# Patient Record
Sex: FEMALE | Race: WHITE | Employment: OTHER | URBAN - METROPOLITAN AREA
[De-identification: names, ages, dates, MRNs, and addresses within clinical notes are randomized per-mention and may not be internally consistent; named-entity substitution may affect disease eponyms.]

---

## 2023-08-23 PROBLEM — E11.65 HYPERGLYCEMIA DUE TO TYPE 2 DIABETES MELLITUS (MULTI): Status: ACTIVE | Noted: 2023-08-23

## 2023-08-23 PROBLEM — Z96.651 HISTORY OF RIGHT KNEE JOINT REPLACEMENT: Status: ACTIVE | Noted: 2023-08-23

## 2023-08-23 PROBLEM — E53.8 B12 DEFICIENCY: Status: ACTIVE | Noted: 2023-08-23

## 2023-08-23 PROBLEM — N95.2 ATROPHY OF VAGINA: Status: ACTIVE | Noted: 2023-08-23

## 2023-08-23 PROBLEM — M17.12 PRIMARY OSTEOARTHRITIS OF LEFT KNEE: Status: ACTIVE | Noted: 2023-08-23

## 2023-08-23 PROBLEM — I10 ESSENTIAL (PRIMARY) HYPERTENSION: Status: ACTIVE | Noted: 2023-08-23

## 2023-08-23 PROBLEM — E11.42 TYPE 2 DIABETES MELLITUS WITH DIABETIC POLYNEUROPATHY, WITHOUT LONG-TERM CURRENT USE OF INSULIN (MULTI): Status: ACTIVE | Noted: 2023-08-23

## 2023-08-23 PROBLEM — E83.52 HYPERCALCEMIA: Status: ACTIVE | Noted: 2023-08-23

## 2023-08-23 PROBLEM — E55.9 VITAMIN D DEFICIENCY: Status: ACTIVE | Noted: 2023-08-23

## 2023-08-23 PROBLEM — E66.8 OTHER OBESITY: Status: ACTIVE | Noted: 2023-08-23

## 2023-08-23 PROBLEM — E66.89 OTHER OBESITY: Status: ACTIVE | Noted: 2023-08-23

## 2023-08-23 PROBLEM — E78.00 ELEVATED CHOLESTEROL: Status: ACTIVE | Noted: 2023-08-23

## 2023-08-23 RX ORDER — ESTRADIOL 0.1 MG/G
CREAM VAGINAL
COMMUNITY
Start: 2022-05-17

## 2023-08-23 RX ORDER — PRAVASTATIN SODIUM 20 MG/1
20 TABLET ORAL DAILY
COMMUNITY
Start: 2015-11-30 | End: 2024-04-01

## 2023-08-23 RX ORDER — PANTOPRAZOLE SODIUM 40 MG/1
1 TABLET, DELAYED RELEASE ORAL DAILY
COMMUNITY
Start: 2018-12-31

## 2023-08-23 RX ORDER — LANOLIN ALCOHOL/MO/W.PET/CERES
1000 CREAM (GRAM) TOPICAL DAILY
COMMUNITY

## 2023-08-23 RX ORDER — LOTEPREDNOL ETABONATE AND TOBRAMYCIN 5; 3 MG/ML; MG/ML
1 SUSPENSION/ DROPS OPHTHALMIC 4 TIMES DAILY
COMMUNITY
Start: 2018-05-07 | End: 2023-10-16 | Stop reason: ALTCHOICE

## 2023-08-23 RX ORDER — MECLIZINE HCL 12.5 MG 12.5 MG/1
12.5 TABLET ORAL AS NEEDED
COMMUNITY

## 2023-08-23 RX ORDER — VIT A/VIT C/VIT E/ZINC/COPPER 4296-226
CAPSULE ORAL
COMMUNITY

## 2023-08-23 RX ORDER — METFORMIN HYDROCHLORIDE 500 MG/1
1000 TABLET, EXTENDED RELEASE ORAL EVERY EVENING
COMMUNITY
Start: 2013-12-16 | End: 2023-11-03 | Stop reason: SDUPTHER

## 2023-08-23 RX ORDER — CETIRIZINE HYDROCHLORIDE, PSEUDOEPHEDRINE HYDROCHLORIDE 5; 120 MG/1; MG/1
TABLET, FILM COATED, EXTENDED RELEASE ORAL
COMMUNITY

## 2023-08-23 RX ORDER — DULAGLUTIDE 1.5 MG/.5ML
1.5 INJECTION, SOLUTION SUBCUTANEOUS
COMMUNITY
Start: 2018-02-06 | End: 2023-11-10

## 2023-08-23 RX ORDER — ALPRAZOLAM 0.5 MG/1
0.5 TABLET ORAL AS NEEDED
COMMUNITY
Start: 2014-01-13

## 2023-08-23 RX ORDER — CHOLECALCIFEROL (VITAMIN D3) 50 MCG
TABLET ORAL
COMMUNITY

## 2023-08-23 RX ORDER — LANSOPRAZOLE 30 MG/1
1 CAPSULE, DELAYED RELEASE ORAL DAILY
COMMUNITY
Start: 2014-02-13 | End: 2023-10-16 | Stop reason: ALTCHOICE

## 2023-08-23 RX ORDER — TRIAMCINOLONE ACETONIDE 55 UG/1
SPRAY, METERED NASAL
COMMUNITY
End: 2023-10-16 | Stop reason: ALTCHOICE

## 2023-08-23 RX ORDER — ASPIRIN 81 MG/1
81 TABLET ORAL DAILY
COMMUNITY

## 2023-08-23 RX ORDER — BLOOD SUGAR DIAGNOSTIC
STRIP MISCELLANEOUS DAILY
COMMUNITY
End: 2024-01-25 | Stop reason: SDUPTHER

## 2023-08-23 RX ORDER — IBUPROFEN 100 MG/5ML
1000 SUSPENSION, ORAL (FINAL DOSE FORM) ORAL DAILY
COMMUNITY

## 2023-08-23 RX ORDER — CETIRIZINE HYDROCHLORIDE 10 MG/1
10 TABLET ORAL DAILY
COMMUNITY
End: 2023-10-16 | Stop reason: SDUPTHER

## 2023-08-23 RX ORDER — LIFITEGRAST 50 MG/ML
1 SOLUTION/ DROPS OPHTHALMIC 2 TIMES DAILY
COMMUNITY
Start: 2018-05-11 | End: 2023-10-16 | Stop reason: ALTCHOICE

## 2023-08-23 RX ORDER — OXYCODONE AND ACETAMINOPHEN 5; 325 MG/1; MG/1
1-2 TABLET ORAL
COMMUNITY
Start: 2016-01-07 | End: 2023-10-16 | Stop reason: ALTCHOICE

## 2023-08-23 RX ORDER — FLAXSEED OIL 1000 MG
CAPSULE ORAL
COMMUNITY
End: 2023-10-16 | Stop reason: ALTCHOICE

## 2023-08-23 RX ORDER — CYCLOSPORINE 0.5 MG/ML
1 EMULSION OPHTHALMIC 2 TIMES DAILY
COMMUNITY

## 2023-08-23 RX ORDER — GABAPENTIN 300 MG/1
CAPSULE ORAL
COMMUNITY
Start: 2015-09-24 | End: 2023-10-16

## 2023-08-23 RX ORDER — LISINOPRIL AND HYDROCHLOROTHIAZIDE 10; 12.5 MG/1; MG/1
1 TABLET ORAL DAILY
COMMUNITY
Start: 2014-02-13

## 2023-10-16 ENCOUNTER — OFFICE VISIT (OUTPATIENT)
Dept: ENDOCRINOLOGY | Facility: CLINIC | Age: 63
End: 2023-10-16
Payer: COMMERCIAL

## 2023-10-16 VITALS
WEIGHT: 191 LBS | DIASTOLIC BLOOD PRESSURE: 85 MMHG | SYSTOLIC BLOOD PRESSURE: 117 MMHG | HEART RATE: 87 BPM | BODY MASS INDEX: 31.78 KG/M2

## 2023-10-16 DIAGNOSIS — E10.42 DIABETIC POLYNEUROPATHY ASSOCIATED WITH TYPE 1 DIABETES MELLITUS (MULTI): Chronic | ICD-10-CM

## 2023-10-16 DIAGNOSIS — E11.65 TYPE 2 DIABETES MELLITUS WITH HYPERGLYCEMIA, WITHOUT LONG-TERM CURRENT USE OF INSULIN (MULTI): Primary | ICD-10-CM

## 2023-10-16 DIAGNOSIS — E78.00 ELEVATED CHOLESTEROL: Chronic | ICD-10-CM

## 2023-10-16 DIAGNOSIS — I10 ESSENTIAL (PRIMARY) HYPERTENSION: Chronic | ICD-10-CM

## 2023-10-16 LAB — POC HEMOGLOBIN A1C: 6 % (ref 4.2–6.5)

## 2023-10-16 PROCEDURE — 99213 OFFICE O/P EST LOW 20 MIN: CPT | Performed by: NURSE PRACTITIONER

## 2023-10-16 PROCEDURE — 3074F SYST BP LT 130 MM HG: CPT | Performed by: NURSE PRACTITIONER

## 2023-10-16 PROCEDURE — 3079F DIAST BP 80-89 MM HG: CPT | Performed by: NURSE PRACTITIONER

## 2023-10-16 PROCEDURE — 83036 HEMOGLOBIN GLYCOSYLATED A1C: CPT | Performed by: NURSE PRACTITIONER

## 2023-10-16 ASSESSMENT — PATIENT HEALTH QUESTIONNAIRE - PHQ9
1. LITTLE INTEREST OR PLEASURE IN DOING THINGS: NOT AT ALL
2. FEELING DOWN, DEPRESSED OR HOPELESS: NOT AT ALL
SUM OF ALL RESPONSES TO PHQ9 QUESTIONS 1 & 2: 0

## 2023-10-16 ASSESSMENT — LIFESTYLE VARIABLES
HOW MANY STANDARD DRINKS CONTAINING ALCOHOL DO YOU HAVE ON A TYPICAL DAY: 1 OR 2
HOW OFTEN DO YOU HAVE A DRINK CONTAINING ALCOHOL: 2-4 TIMES A MONTH

## 2023-10-16 ASSESSMENT — PAIN SCALES - GENERAL: PAINLEVEL: 0-NO PAIN

## 2023-10-16 NOTE — PROGRESS NOTES
61yo with Diabetes 2 (dx age 51) , HTN, Dyslipidemia presents for followup. A1c 6.0% was 5.9%. Pt is testing sugars 1 times per day. Pt is having low sugars 0 times/week. Pt's typical blood sugars are running  on waking, not testing at other times of day . Pt is following a carb controlled diet and knows reasonable carb allowances. Pt is able to afford their medications. Pt is exercising riding stationary bike/walking/swimming.           -CGM no desire  -INSULIN has not been on in the past  -Metformin 500mg er (2), GLP1 Trulicity 1.5mg  -STATIN Pravastatin 20mg for lipids and tolerating LDL   -HTN Lisinopril 10mg/hctz 12.5mg    /85 (BP Location: Left arm, Patient Position: Sitting)   Pulse 87   Wt 86.6 kg (191 lb)   BMI 31.78 kg/m²       Current Outpatient Medications:     ALPRAZolam (Xanax) 0.5 mg tablet, Take 1 tablet (0.5 mg) by mouth if needed., Disp: , Rfl:     ascorbic acid (Vitamin C) 1,000 mg tablet, Take 1 tablet (1,000 mg) by mouth once daily., Disp: , Rfl:     aspirin (Nilam Low Dose Aspirin) 81 mg EC tablet, Take 1 tablet (81 mg) by mouth once daily., Disp: , Rfl:     blood sugar diagnostic (Accu-Chek Amelia Plus test strp) strip, once daily., Disp: , Rfl:     cetirizine-pseudoephedrine (ZyrTEC-D) 5-120 mg 12 hr tablet, Take by mouth., Disp: , Rfl:     cholecalciferol (Vitamin D-3) 50 MCG (2000 UT) tablet, Take by mouth., Disp: , Rfl:     cyanocobalamin (Vitamin B-12) 1,000 mcg tablet, Take 1 tablet (1,000 mcg) by mouth once daily., Disp: , Rfl:     cycloSPORINE (Restasis) 0.05 % ophthalmic emulsion, Administer 1 drop into both eyes 2 times a day., Disp: , Rfl:     dulaglutide (Trulicity) 1.5 mg/0.5 mL pen injector injection, Inject 1.5 mg under the skin 1 (one) time per week., Disp: , Rfl:     estradiol (Estrace) 0.01 % (0.1 mg/gram) vaginal cream, Insert into the vagina., Disp: , Rfl:     lifitegrast (Xiidra) 5 % dropperette, Administer 1 Units into both eyes 2 times a day., Disp: , Rfl:      lisinopriL-hydrochlorothiazide 10-12.5 mg tablet, Take 1 tablet by mouth once daily., Disp: , Rfl:     meclizine (Antivert) 12.5 mg tablet, Take 1 tablet (12.5 mg) by mouth if needed., Disp: , Rfl:     metFORMIN XR (Glucophage-XR) 500 mg 24 hr tablet, Take 2 tablets (1,000 mg) by mouth once daily in the evening., Disp: , Rfl:     omega-3/dha/epa/fish oil (OMEGA-3 ORAL), Take 1 capsule by mouth once daily. Omega 3 2000, Disp: , Rfl:     pantoprazole (ProtoNix) 40 mg EC tablet, Take 1 tablet (40 mg) by mouth once daily., Disp: , Rfl:     pravastatin (Pravachol) 20 mg tablet, Take 1 tablet (20 mg) by mouth once daily., Disp: , Rfl:     vitamins A,C,E-zinc-copper (PreserVision AREDS) 4,296 mcg-226 mg-90 mg capsule, as directed Orally, Disp: , Rfl:     ZINC ORAL, Take 1 tablet by mouth once daily.  Zinc 25 MG, Disp: , Rfl:       1. Type 2 diabetes mellitus with hyperglycemia, without long-term current use of insulin (CMS/HCC)  -excellent A1c  -waking BS under 130  -no changes in treatment plan    2. Diabetic polyneuropathy associated with type 1 diabetes mellitus (CMS/HCC)  -no pain/symptoms    - POCT glycosylated hemoglobin (Hb A1C) manually resulted    3. Elevated cholesterol  -LDL target < 70  -tolerates statin    4. Essential (primary) hypertension  -stable    Follow up CNP 6 months

## 2023-11-03 DIAGNOSIS — E11.65 TYPE 2 DIABETES MELLITUS WITH HYPERGLYCEMIA, WITHOUT LONG-TERM CURRENT USE OF INSULIN (MULTI): Primary | ICD-10-CM

## 2023-11-05 RX ORDER — METFORMIN HYDROCHLORIDE 500 MG/1
1000 TABLET, EXTENDED RELEASE ORAL EVERY EVENING
Qty: 180 TABLET | Refills: 1 | Status: SHIPPED | OUTPATIENT
Start: 2023-11-05 | End: 2024-04-12

## 2023-11-10 DIAGNOSIS — E11.42 TYPE 2 DIABETES MELLITUS WITH DIABETIC POLYNEUROPATHY, WITHOUT LONG-TERM CURRENT USE OF INSULIN (MULTI): Primary | ICD-10-CM

## 2023-11-10 RX ORDER — DULAGLUTIDE 1.5 MG/.5ML
INJECTION, SOLUTION SUBCUTANEOUS
Qty: 6 ML | Refills: 3 | Status: SHIPPED | OUTPATIENT
Start: 2023-11-10 | End: 2024-02-12 | Stop reason: DRUGHIGH

## 2024-01-25 DIAGNOSIS — E11.65 TYPE 2 DIABETES MELLITUS WITH HYPERGLYCEMIA, WITHOUT LONG-TERM CURRENT USE OF INSULIN (MULTI): Primary | ICD-10-CM

## 2024-01-25 RX ORDER — BLOOD SUGAR DIAGNOSTIC
1 STRIP MISCELLANEOUS DAILY
Qty: 100 EACH | Refills: 3 | Status: SHIPPED | OUTPATIENT
Start: 2024-01-25

## 2024-02-07 DIAGNOSIS — E11.65 TYPE 2 DIABETES MELLITUS WITH HYPERGLYCEMIA, WITHOUT LONG-TERM CURRENT USE OF INSULIN (MULTI): Primary | ICD-10-CM

## 2024-02-07 RX ORDER — DULAGLUTIDE 3 MG/.5ML
3 INJECTION, SOLUTION SUBCUTANEOUS
Qty: 2 ML | Refills: 5 | Status: SHIPPED | OUTPATIENT
Start: 2024-02-07 | End: 2024-02-12 | Stop reason: DRUGHIGH

## 2024-02-07 NOTE — TELEPHONE ENCOUNTER
Patient cannot get her 1.5Mg Trulicity her pharmacy has 0.75 [facility] has all of them except the 1.5 she would be willing  to drive there if you send an Rx for the 3MG

## 2024-02-12 ENCOUNTER — OFFICE VISIT (OUTPATIENT)
Dept: ENDOCRINOLOGY | Facility: CLINIC | Age: 64
End: 2024-02-12
Payer: COMMERCIAL

## 2024-02-12 VITALS
HEART RATE: 82 BPM | DIASTOLIC BLOOD PRESSURE: 82 MMHG | WEIGHT: 193.4 LBS | SYSTOLIC BLOOD PRESSURE: 128 MMHG | BODY MASS INDEX: 32.18 KG/M2

## 2024-02-12 DIAGNOSIS — E11.65 TYPE 2 DIABETES MELLITUS WITH HYPERGLYCEMIA, WITHOUT LONG-TERM CURRENT USE OF INSULIN (MULTI): ICD-10-CM

## 2024-02-12 DIAGNOSIS — E78.00 ELEVATED CHOLESTEROL: Primary | ICD-10-CM

## 2024-02-12 DIAGNOSIS — E53.8 VITAMIN B 12 DEFICIENCY: ICD-10-CM

## 2024-02-12 DIAGNOSIS — R53.83 OTHER FATIGUE: ICD-10-CM

## 2024-02-12 DIAGNOSIS — I10 ESSENTIAL (PRIMARY) HYPERTENSION: ICD-10-CM

## 2024-02-12 LAB — POC HEMOGLOBIN A1C: 5.8 % (ref 4.2–6.5)

## 2024-02-12 PROCEDURE — 3074F SYST BP LT 130 MM HG: CPT | Performed by: NURSE PRACTITIONER

## 2024-02-12 PROCEDURE — 99214 OFFICE O/P EST MOD 30 MIN: CPT | Performed by: NURSE PRACTITIONER

## 2024-02-12 PROCEDURE — 83036 HEMOGLOBIN GLYCOSYLATED A1C: CPT | Performed by: NURSE PRACTITIONER

## 2024-02-12 PROCEDURE — 3079F DIAST BP 80-89 MM HG: CPT | Performed by: NURSE PRACTITIONER

## 2024-02-12 RX ORDER — ROSUVASTATIN CALCIUM 10 MG/1
10 TABLET, COATED ORAL DAILY
Qty: 90 TABLET | Refills: 3 | Status: SHIPPED | OUTPATIENT
Start: 2024-02-12 | End: 2025-02-11

## 2024-02-12 RX ORDER — DULAGLUTIDE 0.75 MG/.5ML
0.75 INJECTION, SOLUTION SUBCUTANEOUS
Qty: 6 ML | Refills: 3 | Status: SHIPPED | OUTPATIENT
Start: 2024-02-12 | End: 2024-04-15 | Stop reason: RX

## 2024-02-12 RX ORDER — BISMUTH SUBSALICYLATE 262 MG
1 TABLET,CHEWABLE ORAL DAILY
COMMUNITY

## 2024-02-12 ASSESSMENT — PATIENT HEALTH QUESTIONNAIRE - PHQ9
2. FEELING DOWN, DEPRESSED OR HOPELESS: NOT AT ALL
1. LITTLE INTEREST OR PLEASURE IN DOING THINGS: NOT AT ALL
SUM OF ALL RESPONSES TO PHQ9 QUESTIONS 1 AND 2: 0

## 2024-02-12 ASSESSMENT — ENCOUNTER SYMPTOMS: DEPRESSION: 0

## 2024-02-12 ASSESSMENT — PAIN SCALES - GENERAL: PAINLEVEL: 0-NO PAIN

## 2024-02-12 NOTE — PROGRESS NOTES
HPI     64yo with Diabetes 2 (dx age 51) , HTN, Dyslipidemia presents for followup. A1c 5.8% was 6.0%. Pt is testing sugars 1 times per day. Pt is having low sugars 0 times/week. Pt's typical blood sugars are running  on waking, not testing at other times of day . Pt is following a carb controlled diet and knows reasonable carb allowances. Pt is able to afford their medications. Pt is exercising riding stationary bike/walking/swimming.           -Metformin 500mg er (2), GLP1 Trulicity 1.5 mg was going to increase to 3 mg, wants to decrease to 0.75 mg  -STATIN Pravastatin 20mg for lipids and tolerating LDL 95 switch to rosuvastatin 10 mg   -HTN Lisinopril 10mg/hctz 12.5 mg         Current Outpatient Medications:     ALPRAZolam (Xanax) 0.5 mg tablet, Take 1 tablet (0.5 mg) by mouth if needed., Disp: , Rfl:     ascorbic acid (Vitamin C) 1,000 mg tablet, Take 1 tablet (1,000 mg) by mouth once daily., Disp: , Rfl:     aspirin (Nilam Low Dose Aspirin) 81 mg EC tablet, Take 1 tablet (81 mg) by mouth once daily., Disp: , Rfl:     blood sugar diagnostic (Accu-Chek Amelia Plus test strp) strip, 1 each by in vitro route once daily., Disp: 100 each, Rfl: 3    cetirizine-pseudoephedrine (ZyrTEC-D) 5-120 mg 12 hr tablet, Take by mouth., Disp: , Rfl:     cholecalciferol (Vitamin D-3) 50 MCG (2000 UT) tablet, Take by mouth., Disp: , Rfl:     cyanocobalamin (Vitamin B-12) 1,000 mcg tablet, Take 1 tablet (1,000 mcg) by mouth once daily., Disp: , Rfl:     cycloSPORINE (Restasis) 0.05 % ophthalmic emulsion, Administer 1 drop into both eyes 2 times a day., Disp: , Rfl:     estradiol (Estrace) 0.01 % (0.1 mg/gram) vaginal cream, Insert into the vagina., Disp: , Rfl:     lisinopriL-hydrochlorothiazide 10-12.5 mg tablet, Take 1 tablet by mouth once daily., Disp: , Rfl:     meclizine (Antivert) 12.5 mg tablet, Take 1 tablet (12.5 mg) by mouth if needed., Disp: , Rfl:     metFORMIN  mg 24 hr tablet, Take 2 tablets (1,000 mg) by  mouth once daily in the evening., Disp: 180 tablet, Rfl: 1    omega-3/dha/epa/fish oil (OMEGA-3 ORAL), Take 1 capsule by mouth once daily. Omega 3 2000, Disp: , Rfl:     pantoprazole (ProtoNix) 40 mg EC tablet, Take 1 tablet (40 mg) by mouth once daily., Disp: , Rfl:     pravastatin (Pravachol) 20 mg tablet, Take 1 tablet (20 mg) by mouth once daily., Disp: , Rfl:     vitamins A,C,E-zinc-copper (PreserVision AREDS) 4,296 mcg-226 mg-90 mg capsule, as directed Orally, Disp: , Rfl:     dulaglutide (Trulicity) 0.75 mg/0.5 mL pen injector, Inject 0.75 mg under the skin 1 (one) time per week., Disp: 6 mL, Rfl: 3    multivitamin tablet, Take 1 tablet by mouth once daily., Disp: , Rfl:     rosuvastatin (Crestor) 10 mg tablet, Take 1 tablet (10 mg) by mouth once daily., Disp: 90 tablet, Rfl: 3    ZINC ORAL, Take 1 tablet by mouth once daily.  Zinc 25 MG, Disp: , Rfl:       Allergies as of 02/12/2024 - Reviewed 02/12/2024   Allergen Reaction Noted    Hydromorphone Other 01/06/2016    Codeine Other 08/23/2023         Review of Systems   Cardiology: Lightheadedness-denies.  Chest pain-denies.  Leg edema-denies.  Palpitations-denies.  Respiratory: Cough-denies. Shortness of breath-denies.  Wheezing-denies.  Gastroenterology: Constipation-denies.  Diarrhea-denies.  Heartburn-denies.  Endocrinology: Cold intolerance-denies.  Heat intolerance-denies.  Sweats-denies.  Neurology: Headache-denies.  Tremor-denies.  Neuropathy in extremities-denies.  Psychology: Low energy-denies.  Irritability-denies.  Sleep disturbances-denies.      /82 (BP Location: Left arm, Patient Position: Sitting)   Pulse 82   Wt 87.7 kg (193 lb 6.4 oz)   BMI 32.18 kg/m²       Labs:  Lab Results   Component Value Date    WBC 9.7 04/05/2023    NRBC 0 04/05/2023    RBC 4.83 04/05/2023    HGB 13.7 04/05/2023    HCT 42.1 04/05/2023     04/05/2023     Lab Results   Component Value Date    CALCIUM 9.7 04/05/2023    AST 16 04/05/2023    ALKPHOS 53  04/05/2023    BILITOT 0.3 04/05/2023    PROT 6.7 04/05/2023    ALBUMIN 4.3 04/05/2023    GLOB 2.4 04/05/2023    AGR 1.8 04/05/2023     04/05/2023    K 4.7 04/05/2023     04/05/2023    CO2 25 04/05/2023    ANIONGAP 13 04/05/2023    BUN 19 04/05/2023    CREATININE 0.9 04/05/2023    UREACREAUR 21.1 (H) 04/05/2023    GLUCOSE 111 (H) 04/05/2023    ALT 17 04/05/2023    EGFR 72 04/05/2023     Lab Results   Component Value Date    CHOL 170 04/05/2023    TRIG 216 (H) 04/05/2023    HDL 54 04/05/2023    LDLCALC 73 04/05/2023     Lab Results   Component Value Date    MICROALBCREA 37.2 (H) 04/05/2023     Lab Results   Component Value Date    TSH 1.07 01/29/2021     Lab Results   Component Value Date    TRDZZRCT17 1,948 (H) 04/05/2023     Lab Results   Component Value Date    HGBA1C 5.8 02/12/2024         Physical Exam   General Appearance: pleasant, cooperative, no acute distress  HEENT: no chemosis, no proptosis, no lid lag, no lid retraction  Neck: no lymphadenopathy, no thyromegaly, no dominant thyroid nodules  Heart: no murmurs, regular rate and rhythm, S1 and S2  Lungs: no wheezes, no rhonci, no rales  Extremities: no lower extremity swelling      Assessment/Plan   1. Type 2 diabetes mellitus with hyperglycemia, without long-term current use of insulin (CMS/[facility])  -excellent A1c  -due to supply issues prefers to decrease dose versus increase Trulicity  -discussed increasing Metformin up to 4 tabs daily if needed    - POCT glycosylated hemoglobin (Hb A1C) manually resulted  - dulaglutide (Trulicity) 0.75 mg/0.5 mL pen injector; Inject 0.75 mg under the skin 1 (one) time per week.  Dispense: 6 mL; Refill: 3  - Albumin , Urine Random; Future  - Comprehensive Metabolic Panel; Future    2. Elevated cholesterol  -stop pravastatin try rosuvastatin   -LDL target < 70  -tolerates statin  -check labs before next visit    - rosuvastatin (Crestor) 10 mg tablet; Take 1 tablet (10 mg) by mouth once daily.  Dispense: 90 tablet;  Refill: 3  - Lipid Panel; Future    3. Essential (primary) hypertension  -at target     Follow Up: keep April appointment    -labs/tests/notes reviewed  -reviewed and counseled patient on medication monitoring and side effects    Medical Decision Making  Complexity of problem: Chronic illness of diabetes mellitus uncontrolled, progressing  Data analyzed and reviewed: Reviewed prior notes, blood glucose data, labs including HgbA1c, lipids, serum chemistries.  Ordered tests.   Prescription medications reviewed and modifications made.  Compliance assessed.  Addressed social determinants of health including food insecurity.

## 2024-04-01 ENCOUNTER — OFFICE VISIT (OUTPATIENT)
Dept: OBSTETRICS AND GYNECOLOGY | Facility: CLINIC | Age: 64
End: 2024-04-01
Payer: COMMERCIAL

## 2024-04-01 VITALS
BODY MASS INDEX: 31.5 KG/M2 | HEIGHT: 66 IN | SYSTOLIC BLOOD PRESSURE: 133 MMHG | DIASTOLIC BLOOD PRESSURE: 83 MMHG | WEIGHT: 196 LBS

## 2024-04-01 DIAGNOSIS — Z78.0 MENOPAUSE: Primary | ICD-10-CM

## 2024-04-01 DIAGNOSIS — Z01.419 ENCOUNTER FOR GYNECOLOGICAL EXAMINATION WITHOUT ABNORMAL FINDING: ICD-10-CM

## 2024-04-01 PROCEDURE — 3079F DIAST BP 80-89 MM HG: CPT | Performed by: OBSTETRICS & GYNECOLOGY

## 2024-04-01 PROCEDURE — 99396 PREV VISIT EST AGE 40-64: CPT | Performed by: OBSTETRICS & GYNECOLOGY

## 2024-04-01 PROCEDURE — 3075F SYST BP GE 130 - 139MM HG: CPT | Performed by: OBSTETRICS & GYNECOLOGY

## 2024-04-01 NOTE — PROGRESS NOTES
Subjective   Maria Elena De Los Santos is a 63 y.o. female here for a routine exam.  She has no postmenopausal bleeding or discharge.  No dysuria or change in bowel habits.  She has been using estradiol cream to manage genitourinary syndrome of menopause.  She denies needing a refill at this time.    She  to a female partner, has no risk for infection.  Her bone density in 2017 was normal.    Personal health questionnaire reviewed: yes.     Gynecologic History  No LMP recorded (lmp unknown). Patient is postmenopausal.  Contraception: post menopausal status  Last Pap: 3/19/21. Results were: normal  Last mammogram: 6/28/23. Results were: normal    Obstetric History  OB History   No obstetric history on file.       Objective   Constitutional: Alert and in no acute distress. Well developed, well nourished.   Head and Face: Head and face: Normal.    Eyes: Normal external exam - nonicteric sclera, extraocular movements intact (EOMI) and no ptosis.   Neck: No neck asymmetry. Supple. Thyroid not enlarged and there were no palpable thyroid nodules.    Pulmonary: No respiratory distress.   Chest: Breasts: Normal appearance, no nipple discharge and no skin changes. Palpation of breasts and axillae: No palpable mass and no axillary lymphadenopathy.   Abdomen: Soft nontender; no abdominal mass palpated. No organomegaly. No hernias.   Genitourinary: External genitalia: Normal. No inguinal lymphadenopathy. Bartholin's Urethral and Skenes Glands: Normal. Urethra: Normal.  Bladder: Normal on palpation. Vagina: Normal. Cervix: Normal.  Uterus: Normal.  Right Adnexa/parametria: Normal.  Left Adnexa/parametria: Normal.  Inspection of Perianal Area: Normal.   Musculoskeletal: No joint swelling seen, normal movements of all extremities.   Skin: Normal skin color and pigmentation, normal skin turgor, and no rash.   Neurologic: Non-focal. Grossly intact.   Psychiatric: Alert and oriented x 3. Affect normal to patient baseline. Mood:  Appropriate.  Physical Exam     Assessment/Plan   Healthy female exam.  This is a 63-year-old female with a normal exam.  No Pap smear was sent, she has minimal risk for HPV, we discussed we could check next visit.    Her routine mammogram was ordered with tomosynthesis.    I do recommend obtaining a bone density test to monitor the bone health.  In menopause, I recommend calcium, vitamin D and weightbearing exercise.    She is on estradiol cream, I recommend continuing a pea-sized amount at the vaginal opening 3 times a week.  She will contact us if she needs a refill.     I will see her routinely in 1 year.  Mammogram ordered.

## 2024-04-11 ENCOUNTER — LAB (OUTPATIENT)
Dept: LAB | Facility: LAB | Age: 64
End: 2024-04-11
Payer: COMMERCIAL

## 2024-04-11 DIAGNOSIS — E78.00 ELEVATED CHOLESTEROL: ICD-10-CM

## 2024-04-11 DIAGNOSIS — E11.65 TYPE 2 DIABETES MELLITUS WITH HYPERGLYCEMIA, WITHOUT LONG-TERM CURRENT USE OF INSULIN (MULTI): ICD-10-CM

## 2024-04-11 DIAGNOSIS — E53.8 VITAMIN B 12 DEFICIENCY: ICD-10-CM

## 2024-04-11 DIAGNOSIS — R53.83 OTHER FATIGUE: ICD-10-CM

## 2024-04-11 LAB
ALBUMIN SERPL-MCNC: 4.5 G/DL (ref 3.5–5)
ALP BLD-CCNC: 50 U/L (ref 35–125)
ALT SERPL-CCNC: 20 U/L (ref 5–40)
ANION GAP SERPL CALC-SCNC: 11 MMOL/L
AST SERPL-CCNC: 19 U/L (ref 5–40)
BILIRUB SERPL-MCNC: 0.4 MG/DL (ref 0.1–1.2)
BUN SERPL-MCNC: 23 MG/DL (ref 8–25)
CALCIUM SERPL-MCNC: 10 MG/DL (ref 8.5–10.4)
CHLORIDE SERPL-SCNC: 103 MMOL/L (ref 97–107)
CHOLEST SERPL-MCNC: 139 MG/DL (ref 133–200)
CHOLEST/HDLC SERPL: 2.3 {RATIO}
CO2 SERPL-SCNC: 26 MMOL/L (ref 24–31)
CREAT SERPL-MCNC: 1 MG/DL (ref 0.4–1.6)
CREAT UR-MCNC: 211 MG/DL
EGFRCR SERPLBLD CKD-EPI 2021: 63 ML/MIN/1.73M*2
GLUCOSE SERPL-MCNC: 91 MG/DL (ref 65–99)
HDLC SERPL-MCNC: 60 MG/DL
LDLC SERPL CALC-MCNC: 62 MG/DL (ref 65–130)
MICROALBUMIN UR-MCNC: <12 MG/L (ref 0–23)
MICROALBUMIN/CREAT UR: NORMAL MG/G{CREAT}
POTASSIUM SERPL-SCNC: 4.8 MMOL/L (ref 3.4–5.1)
PROT SERPL-MCNC: 6.6 G/DL (ref 5.9–7.9)
SODIUM SERPL-SCNC: 140 MMOL/L (ref 133–145)
TRIGL SERPL-MCNC: 84 MG/DL (ref 40–150)
TSH SERPL DL<=0.05 MIU/L-ACNC: 1.4 MIU/L (ref 0.27–4.2)
VIT B12 SERPL-MCNC: 1874 PG/ML (ref 211–946)

## 2024-04-11 PROCEDURE — 82607 VITAMIN B-12: CPT

## 2024-04-11 PROCEDURE — 82570 ASSAY OF URINE CREATININE: CPT

## 2024-04-11 PROCEDURE — 36415 COLL VENOUS BLD VENIPUNCTURE: CPT

## 2024-04-11 PROCEDURE — 80061 LIPID PANEL: CPT

## 2024-04-11 PROCEDURE — 82043 UR ALBUMIN QUANTITATIVE: CPT

## 2024-04-11 PROCEDURE — 84443 ASSAY THYROID STIM HORMONE: CPT

## 2024-04-11 PROCEDURE — 80053 COMPREHEN METABOLIC PANEL: CPT

## 2024-04-12 DIAGNOSIS — E11.65 TYPE 2 DIABETES MELLITUS WITH HYPERGLYCEMIA, WITHOUT LONG-TERM CURRENT USE OF INSULIN (MULTI): ICD-10-CM

## 2024-04-12 RX ORDER — METFORMIN HYDROCHLORIDE 500 MG/1
TABLET, EXTENDED RELEASE ORAL
Qty: 180 TABLET | Refills: 0 | Status: SHIPPED | OUTPATIENT
Start: 2024-04-12

## 2024-04-15 ENCOUNTER — OFFICE VISIT (OUTPATIENT)
Dept: ENDOCRINOLOGY | Facility: CLINIC | Age: 64
End: 2024-04-15
Payer: COMMERCIAL

## 2024-04-15 VITALS
WEIGHT: 193.4 LBS | SYSTOLIC BLOOD PRESSURE: 135 MMHG | HEART RATE: 79 BPM | DIASTOLIC BLOOD PRESSURE: 79 MMHG | BODY MASS INDEX: 31.22 KG/M2

## 2024-04-15 DIAGNOSIS — I10 ESSENTIAL (PRIMARY) HYPERTENSION: ICD-10-CM

## 2024-04-15 DIAGNOSIS — E11.65 TYPE 2 DIABETES MELLITUS WITH HYPERGLYCEMIA, WITHOUT LONG-TERM CURRENT USE OF INSULIN (MULTI): Primary | ICD-10-CM

## 2024-04-15 DIAGNOSIS — E78.00 ELEVATED CHOLESTEROL: ICD-10-CM

## 2024-04-15 LAB — POC HEMOGLOBIN A1C: 5.9 % (ref 4.2–6.5)

## 2024-04-15 PROCEDURE — 99214 OFFICE O/P EST MOD 30 MIN: CPT | Performed by: NURSE PRACTITIONER

## 2024-04-15 PROCEDURE — 3062F POS MACROALBUMINURIA REV: CPT | Performed by: NURSE PRACTITIONER

## 2024-04-15 PROCEDURE — 3078F DIAST BP <80 MM HG: CPT | Performed by: NURSE PRACTITIONER

## 2024-04-15 PROCEDURE — 3048F LDL-C <100 MG/DL: CPT | Performed by: NURSE PRACTITIONER

## 2024-04-15 PROCEDURE — 83036 HEMOGLOBIN GLYCOSYLATED A1C: CPT | Performed by: NURSE PRACTITIONER

## 2024-04-15 PROCEDURE — 3075F SYST BP GE 130 - 139MM HG: CPT | Performed by: NURSE PRACTITIONER

## 2024-04-15 RX ORDER — CETIRIZINE HYDROCHLORIDE 10 MG/1
TABLET, CHEWABLE ORAL DAILY
COMMUNITY
End: 2024-04-30 | Stop reason: ALTCHOICE

## 2024-04-15 RX ORDER — FLUTICASONE PROPIONATE 50 MCG
1 SPRAY, SUSPENSION (ML) NASAL DAILY
COMMUNITY

## 2024-04-15 RX ORDER — TIRZEPATIDE 2.5 MG/.5ML
2.5 INJECTION, SOLUTION SUBCUTANEOUS
Qty: 2 ML | Refills: 0 | Status: SHIPPED | OUTPATIENT
Start: 2024-04-15 | End: 2024-04-26

## 2024-04-15 ASSESSMENT — LIFESTYLE VARIABLES
HOW MANY STANDARD DRINKS CONTAINING ALCOHOL DO YOU HAVE ON A TYPICAL DAY: 1 OR 2
AUDIT-C TOTAL SCORE: 2
SKIP TO QUESTIONS 9-10: 1
HOW OFTEN DO YOU HAVE A DRINK CONTAINING ALCOHOL: 2-4 TIMES A MONTH
HOW OFTEN DO YOU HAVE SIX OR MORE DRINKS ON ONE OCCASION: NEVER

## 2024-04-15 ASSESSMENT — ENCOUNTER SYMPTOMS
OCCASIONAL FEELINGS OF UNSTEADINESS: 0
DEPRESSION: 0
LOSS OF SENSATION IN FEET: 0

## 2024-04-15 NOTE — PATIENT INSTRUCTIONS
Target blood sugars are  when waking, and under 180 two hours after a meal and before bedtime.    METFORMIN 2 TABS A DAY  TRULICITY FINISH WHAT YOU HAVE  MOUNJARO 2.5 MG WEEKLY  CALL WEEK 2-3 TO GET 5 MG DOSE SENT

## 2024-04-15 NOTE — PROGRESS NOTES
HPI   64yo with Diabetes 2 (dx age 51) , HTN, Dyslipidemia presents for follow up and review recent labs. A1c 5.9% was 5.8%. Pt is testing sugars 1 times per day. Pt is having low sugars 0 times/week. Pt's typical blood sugars are running  on waking, not testing at other times of day . Pt is following a carb controlled diet and knows reasonable carb allowances. Pt is able to afford their medications. Pt is exercising riding stationary bike/walking/swimming.           -Metformin 500mg er (2), GLP1 Trulicity not available, switch to Mounjaro  -STATIN Rosuvastatin 10 mg LDLCAL 62  -HTN Lisinopril 10mg/hctz 12.5 mg       Current Outpatient Medications:     ALPRAZolam (Xanax) 0.5 mg tablet, Take 1 tablet (0.5 mg) by mouth if needed., Disp: , Rfl:     ascorbic acid (Vitamin C) 1,000 mg tablet, Take 1 tablet (1,000 mg) by mouth once daily., Disp: , Rfl:     aspirin (Nilam Low Dose Aspirin) 81 mg EC tablet, Take 1 tablet (81 mg) by mouth once daily., Disp: , Rfl:     blood sugar diagnostic (Accu-Chek Amelia Plus test strp) strip, 1 each by in vitro route once daily., Disp: 100 each, Rfl: 3    cetirizine-pseudoephedrine (ZyrTEC-D) 5-120 mg 12 hr tablet, Take by mouth., Disp: , Rfl:     cholecalciferol (Vitamin D-3) 50 MCG (2000 UT) tablet, Take by mouth., Disp: , Rfl:     cyanocobalamin (Vitamin B-12) 1,000 mcg tablet, Take 1 tablet (1,000 mcg) by mouth once daily., Disp: , Rfl:     cycloSPORINE (Restasis) 0.05 % ophthalmic emulsion, Administer 1 drop into both eyes 2 times a day., Disp: , Rfl:     dulaglutide (Trulicity) 0.75 mg/0.5 mL pen injector, Inject 0.75 mg under the skin 1 (one) time per week., Disp: 6 mL, Rfl: 3    estradiol (Estrace) 0.01 % (0.1 mg/gram) vaginal cream, Insert into the vagina., Disp: , Rfl:     lisinopriL-hydrochlorothiazide 10-12.5 mg tablet, Take 1 tablet by mouth once daily., Disp: , Rfl:     meclizine (Antivert) 12.5 mg tablet, Take 1 tablet (12.5 mg) by mouth if needed., Disp: , Rfl:      medical cannabis, Take 1 each by mouth., Disp: , Rfl:     metFORMIN  mg 24 hr tablet, TAKE 2 TABLETS (1,000 MG) BY MOUTH ONCE DAILY IN THE EVENING., Disp: 180 tablet, Rfl: 0    multivitamin tablet, Take 1 tablet by mouth once daily., Disp: , Rfl:     omega-3/dha/epa/fish oil (OMEGA-3 ORAL), Take 1 capsule by mouth once daily. Omega 3 2000, Disp: , Rfl:     pantoprazole (ProtoNix) 40 mg EC tablet, Take 1 tablet (40 mg) by mouth once daily., Disp: , Rfl:     rosuvastatin (Crestor) 10 mg tablet, Take 1 tablet (10 mg) by mouth once daily., Disp: 90 tablet, Rfl: 3    vitamins A,C,E-zinc-copper (PreserVision AREDS) 4,296 mcg-226 mg-90 mg capsule, as directed Orally, Disp: , Rfl:     cetirizine (ZyrTEC) 10 mg chewable tablet, Chew once daily., Disp: , Rfl:     fluticasone (Flonase) 50 mcg/actuation nasal spray, Administer 1 spray into each nostril once daily. Shake gently. Before first use, prime pump. After use, clean tip and replace cap., Disp: , Rfl:     tirzepatide (Mounjaro) 2.5 mg/0.5 mL pen injector, Inject 2.5 mg under the skin every 7 days., Disp: 2 mL, Rfl: 0      Allergies as of 04/15/2024 - Reviewed 04/15/2024   Allergen Reaction Noted    Hydromorphone Other 01/06/2016    Codeine Other 08/23/2023         Review of Systems   Cardiology: Lightheadedness-denies.  Chest pain-denies.  Leg edema-denies.  Palpitations-denies.  Respiratory: Cough-denies. Shortness of breath-denies.  Wheezing-denies.  Gastroenterology: Constipation-denies.  Diarrhea-denies.  Heartburn-denies.  Endocrinology: Cold intolerance-denies.  Heat intolerance-denies.  Sweats-denies.  Neurology: Headache-denies.  Tremor-denies.  Neuropathy in extremities-denies.  Psychology: Low energy-denies.  Irritability-denies.  Sleep disturbances-denies.      /79 (BP Location: Right arm)   Pulse 79   Wt 87.7 kg (193 lb 6.4 oz)   LMP  (LMP Unknown)   BMI 31.22 kg/m²       Labs:  Lab Results   Component Value Date    WBC 9.7 04/05/2023    NRBC  0 04/05/2023    RBC 4.83 04/05/2023    HGB 13.7 04/05/2023    HCT 42.1 04/05/2023     04/05/2023     Lab Results   Component Value Date    CALCIUM 10.0 04/11/2024    AST 19 04/11/2024    ALKPHOS 50 04/11/2024    BILITOT 0.4 04/11/2024    PROT 6.6 04/11/2024    ALBUMIN 4.5 04/11/2024    GLOB 2.4 04/05/2023    AGR 1.8 04/05/2023     04/11/2024    K 4.8 04/11/2024     04/11/2024    CO2 26 04/11/2024    ANIONGAP 11 04/11/2024    BUN 23 04/11/2024    CREATININE 1.00 04/11/2024    UREACREAUR 21.1 (H) 04/05/2023    GLUCOSE 91 04/11/2024    ALT 20 04/11/2024    EGFR 63 04/11/2024     Lab Results   Component Value Date    CHOL 139 04/11/2024    TRIG 84 04/11/2024    HDL 60.0 04/11/2024    LDLCALC 62 (L) 04/11/2024     Lab Results   Component Value Date    MICROALBCREA  04/11/2024      Comment:      One or more analytes used in this calculation is outside of the analytical measurement range. Calculation cannot be performed.     Lab Results   Component Value Date    TSH 1.40 04/11/2024     Lab Results   Component Value Date    XMLQVDLC56 1,874 (H) 04/11/2024     Lab Results   Component Value Date    HGBA1C 5.9 04/15/2024         Physical Exam   General Appearance: pleasant, cooperative, no acute distress  HEENT: no chemosis, no proptosis, no lid lag, no lid retraction  Neck: no lymphadenopathy, no thyromegaly, no dominant thyroid nodules  Heart: no murmurs, regular rate and rhythm, S1 and S2  Lungs: no wheezes, no rhonci, no rales  Extremities: no lower extremity swelling      Assessment/Plan   1. Type 2 diabetes mellitus with hyperglycemia, without long-term current use of insulin ([facility])  -switch to Mounjaro if not covered/affordable try Ozempic  -Trulicity availability issues, side effects with higher dose of Trulicity   -having more waking BS above 130  -reviewed target BS  -reviewed carbs per meal/snack  -Metformin 2 tablets daily best tolerated     - POCT glycosylated hemoglobin (Hb A1C) manually  resulted  - tirzepatide (Mounjaro) 2.5 mg/0.5 mL pen injector; Inject 2.5 mg under the skin every 7 days.  Dispense: 2 mL; Refill: 0    2. Essential (primary) hypertension  -stable    3. Elevated cholesterol  -tolerates statin   -switched from pravastatin to rosuvastatin previous visit  -LDLCALC      Follow Up: CNP 4 months       -labs/tests/notes reviewed  -reviewed and counseled patient on medication monitoring and side effect

## 2024-04-26 DIAGNOSIS — E11.65 TYPE 2 DIABETES MELLITUS WITH HYPERGLYCEMIA, WITHOUT LONG-TERM CURRENT USE OF INSULIN (MULTI): ICD-10-CM

## 2024-04-26 DIAGNOSIS — E11.65 TYPE 2 DIABETES MELLITUS WITH HYPERGLYCEMIA, WITHOUT LONG-TERM CURRENT USE OF INSULIN (MULTI): Primary | ICD-10-CM

## 2024-04-26 RX ORDER — TIRZEPATIDE 5 MG/.5ML
5 INJECTION, SOLUTION SUBCUTANEOUS
Qty: 2 ML | Refills: 5 | Status: SHIPPED | OUTPATIENT
Start: 2024-04-28

## 2024-04-26 RX ORDER — TIRZEPATIDE 2.5 MG/.5ML
2.5 INJECTION, SOLUTION SUBCUTANEOUS
Qty: 2 ML | Refills: 5 | Status: SHIPPED | OUTPATIENT
Start: 2024-04-28

## 2024-04-30 ENCOUNTER — OFFICE VISIT (OUTPATIENT)
Dept: ENDOCRINOLOGY | Facility: CLINIC | Age: 64
End: 2024-04-30
Payer: COMMERCIAL

## 2024-04-30 VITALS
HEART RATE: 76 BPM | SYSTOLIC BLOOD PRESSURE: 120 MMHG | DIASTOLIC BLOOD PRESSURE: 70 MMHG | HEIGHT: 66 IN | BODY MASS INDEX: 30.47 KG/M2 | RESPIRATION RATE: 16 BRPM | WEIGHT: 189.6 LBS

## 2024-04-30 DIAGNOSIS — I10 HYPERTENSION, UNSPECIFIED TYPE: ICD-10-CM

## 2024-04-30 DIAGNOSIS — E78.5 HYPERLIPIDEMIA, UNSPECIFIED HYPERLIPIDEMIA TYPE: ICD-10-CM

## 2024-04-30 DIAGNOSIS — E11.65 TYPE 2 DIABETES MELLITUS WITH HYPERGLYCEMIA, WITHOUT LONG-TERM CURRENT USE OF INSULIN (MULTI): Primary | ICD-10-CM

## 2024-04-30 PROCEDURE — 3078F DIAST BP <80 MM HG: CPT | Performed by: INTERNAL MEDICINE

## 2024-04-30 PROCEDURE — 3062F POS MACROALBUMINURIA REV: CPT | Performed by: INTERNAL MEDICINE

## 2024-04-30 PROCEDURE — 3074F SYST BP LT 130 MM HG: CPT | Performed by: INTERNAL MEDICINE

## 2024-04-30 PROCEDURE — 3048F LDL-C <100 MG/DL: CPT | Performed by: INTERNAL MEDICINE

## 2024-04-30 PROCEDURE — 99204 OFFICE O/P NEW MOD 45 MIN: CPT | Performed by: INTERNAL MEDICINE

## 2024-04-30 ASSESSMENT — ENCOUNTER SYMPTOMS
NAUSEA: 0
FEVER: 0
PALPITATIONS: 0
DIARRHEA: 0
COUGH: 0
CHILLS: 0
SHORTNESS OF BREATH: 0
VOMITING: 0
FATIGUE: 0
HEADACHES: 0

## 2024-04-30 NOTE — PROGRESS NOTES
Endocrinology New Patient Consult  Subjective   Patient ID: Maria Elena De Los Santos is a 63 y.o. female who presents for Diabetes. Patient is a self referral.     PCP: Santiago Salinas MD    HPI  63-year-old self-referred for evaluation of type 2 diabetes also with hypertension and hyperlipidemia.  She has been diabetic for about 10 years.  She has been very stable on metformin and Trulicity for quite some time.  Her last A1c done in the last few weeks was excellent at 5.9.  She has struggled with Trulicity stocking recently and does have Mounjaro 2 start in the near future.  She believes she has a prescription of both 2.5 and 5 mg.  She has not been on Ozempic in the past.  She is taking metformin 500 mg twice a day.  She has tried increasing the dose but is minimally tolerant of more than the 1000 mg a day.  She has not been on SGLT2 inhibitors in the past.  She is very active and works hard on her eating habits and exercise.  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  She is current with her eye exam and denies any history of diabetic retinopathy or neuropathy.      Review of Systems   Constitutional:  Negative for chills, fatigue and fever.   Respiratory:  Negative for cough and shortness of breath.    Cardiovascular:  Negative for chest pain and palpitations.   Gastrointestinal:  Negative for diarrhea, nausea and vomiting.   Neurological:  Negative for headaches.       Patient Active Problem List   Diagnosis    Atrophy of vagina    B12 deficiency    Elevated cholesterol    Essential (primary) hypertension    History of right knee joint replacement    Hypercalcemia    Hyperglycemia due to type 2 diabetes mellitus (Multi)    Primary osteoarthritis of left knee    Other obesity    Type 2 diabetes mellitus with diabetic polyneuropathy, without long-term current use of insulin (Multi)    Vitamin D deficiency       Past Medical History:   Diagnosis Date    Type 2 diabetes mellitus (Multi)     Type 2 diabetes  mellitus with diabetic polyneuropathy, without long-term current use of insulin (Multi)         Past Surgical History:   Procedure Laterality Date    KNEE SURGERY Left 2016    OTHER SURGICAL HISTORY Left 2019    Melanoma left upper arm    TONSILLECTOMY          Social History     Tobacco Use   Smoking Status Former    Current packs/day: 0.00    Types: Cigarettes    Quit date: 2018    Years since quittin.3    Passive exposure: Never   Smokeless Tobacco Not on file      Social History     Substance and Sexual Activity   Alcohol Use Yes      Marital status:   Employment: Retired from [employer]    Family History   Problem Relation Name Age of Onset    Other (blood cancer) Father          Home Meds:  Current Outpatient Medications   Medication Instructions    ALPRAZolam (XANAX) 0.5 mg, oral, As needed    ascorbic acid (VITAMIN C) 1,000 mg, oral, Daily    aspirin (YESSI LOW DOSE ASPIRIN) 81 mg, oral, Daily    blood sugar diagnostic (Accu-Chek Amelia Plus test strp) strip 1 each, in vitro, Daily    cetirizine-pseudoephedrine (ZyrTEC-D) 5-120 mg 12 hr tablet oral    cholecalciferol (Vitamin D-3) 50 MCG (2000 UT) tablet oral    cyanocobalamin (VITAMIN B-12) 1,000 mcg, oral, Daily    cycloSPORINE (Restasis) 0.05 % ophthalmic emulsion 1 drop, Both Eyes, 2 times daily    estradiol (Estrace) 0.01 % (0.1 mg/gram) vaginal cream vaginal    fluticasone (Flonase) 50 mcg/actuation nasal spray 1 spray, Each Nostril, Daily, Shake gently. Before first use, prime pump. After use, clean tip and replace cap.    lisinopriL-hydrochlorothiazide 10-12.5 mg tablet 1 tablet, oral, Daily    meclizine (ANTIVERT) 12.5 mg, oral, As needed    medical cannabis 1 each, oral    metFORMIN  mg 24 hr tablet TAKE 2 TABLETS (1,000 MG) BY MOUTH ONCE DAILY IN THE EVENING.    Mounjaro 2.5 mg, subcutaneous, Once Weekly    Mounjaro 5 mg, subcutaneous, Once Weekly    multivitamin tablet 1 tablet, oral, Daily     "omega-3/dha/epa/fish oil (OMEGA-3 ORAL) 1 capsule, oral, Daily, Omega 3 2000    pantoprazole (ProtoNix) 40 mg EC tablet 1 tablet, oral, Daily    rosuvastatin (CRESTOR) 10 mg, oral, Daily    vitamins A,C,E-zinc-copper (PreserVision AREDS) 4,296 mcg-226 mg-90 mg capsule  as directed Orally        Allergies   Allergen Reactions    Hydromorphone Other     Blood pressure dropped    Codeine Other     stomach upset        Objective   Vitals:    04/30/24 1055   BP: 120/70   Pulse: 76   Resp: 16      Vitals:    04/30/24 1055   Weight: 86 kg (189 lb 9.6 oz)      Body mass index is 30.6 kg/m².   Physical Exam  Constitutional:       Appearance: Normal appearance. She is overweight.   HENT:      Head: Normocephalic and atraumatic.   Neck:      Thyroid: No thyroid mass, thyromegaly or thyroid tenderness.   Cardiovascular:      Rate and Rhythm: Normal rate and regular rhythm.      Heart sounds: No murmur heard.     No gallop.   Pulmonary:      Effort: Pulmonary effort is normal.      Breath sounds: Normal breath sounds.   Abdominal:      Palpations: Abdomen is soft.      Comments: benign   Neurological:      General: No focal deficit present.      Mental Status: She is alert and oriented to person, place, and time.      Deep Tendon Reflexes: Reflexes are normal and symmetric.   Psychiatric:         Behavior: Behavior is cooperative.         Labs:  Lab Results   Component Value Date    HGBA1C 5.9 04/15/2024    TSH 1.40 04/11/2024      No results found for: \"PR1\", \"THYROIDPAB\", \"TSI\"     Assessment/Plan   Problem List Items Addressed This Visit       Hyperglycemia due to type 2 diabetes mellitus (Multi) - Primary    Relevant Orders    Comprehensive Metabolic Panel    Hemoglobin A1C     Other Visit Diagnoses       Hyperlipidemia, unspecified hyperlipidemia type        Hypertension, unspecified type              63-year-old here for evaluation of type 2 diabetes also with hypertension and hyperlipidemia.  We reviewed her course.  " Currently she is doing excellent with her A1c's.  We discussed Mounjaro versus Ozempic and reviewed SGLT2 inhibitors.  She will proceed with switching to Mounjaro for now.  I encouraged her to keep up her good work with diet and exercise.  Her blood pressure was excellent today.  She is stable on rosuvastatin for cholesterol.  I will plan to see her back in 3 months I encouraged her to call or message with any concerns or questions    Electronically signed by:  Uyen Trujillo MD 04/30/24  10:02 PM

## 2024-05-01 NOTE — PATIENT INSTRUCTIONS
Continue metformin at 500 mg twice a day  Switch to Mounjaro as planned  Keep up efforts with diet and exercise  Follow-up in 3 months  Please call or message with any concerns or questions

## 2024-05-15 ENCOUNTER — HOSPITAL ENCOUNTER (OUTPATIENT)
Dept: RADIOLOGY | Facility: CLINIC | Age: 64
Discharge: HOME | End: 2024-05-15
Payer: COMMERCIAL

## 2024-05-15 DIAGNOSIS — E11.65 TYPE 2 DIABETES MELLITUS WITH HYPERGLYCEMIA, WITHOUT LONG-TERM CURRENT USE OF INSULIN (MULTI): Primary | ICD-10-CM

## 2024-05-15 DIAGNOSIS — Z78.0 MENOPAUSE: ICD-10-CM

## 2024-05-15 PROCEDURE — 77080 DXA BONE DENSITY AXIAL: CPT

## 2024-05-15 RX ORDER — SEMAGLUTIDE 0.68 MG/ML
0.5 INJECTION, SOLUTION SUBCUTANEOUS
Qty: 9 ML | Refills: 1 | Status: SHIPPED | OUTPATIENT
Start: 2024-05-15

## 2024-06-24 DIAGNOSIS — E11.65 TYPE 2 DIABETES MELLITUS WITH HYPERGLYCEMIA, WITHOUT LONG-TERM CURRENT USE OF INSULIN (MULTI): ICD-10-CM

## 2024-06-24 RX ORDER — METFORMIN HYDROCHLORIDE 500 MG/1
1000 TABLET, EXTENDED RELEASE ORAL
Qty: 180 TABLET | Refills: 1 | Status: SHIPPED | OUTPATIENT
Start: 2024-06-24 | End: 2025-06-24

## 2024-06-28 ENCOUNTER — HOSPITAL ENCOUNTER (OUTPATIENT)
Dept: RADIOLOGY | Facility: CLINIC | Age: 64
Discharge: HOME | End: 2024-06-28
Payer: COMMERCIAL

## 2024-06-28 VITALS — HEIGHT: 66 IN | WEIGHT: 189.6 LBS | BODY MASS INDEX: 30.47 KG/M2

## 2024-06-28 DIAGNOSIS — Z01.419 ENCOUNTER FOR GYNECOLOGICAL EXAMINATION WITHOUT ABNORMAL FINDING: ICD-10-CM

## 2024-06-28 PROCEDURE — 77067 SCR MAMMO BI INCL CAD: CPT

## 2024-07-19 ENCOUNTER — LAB (OUTPATIENT)
Dept: LAB | Facility: LAB | Age: 64
End: 2024-07-19
Payer: COMMERCIAL

## 2024-07-19 DIAGNOSIS — E11.65 TYPE 2 DIABETES MELLITUS WITH HYPERGLYCEMIA, WITHOUT LONG-TERM CURRENT USE OF INSULIN (MULTI): ICD-10-CM

## 2024-07-19 LAB
ALBUMIN SERPL BCP-MCNC: 4.4 G/DL (ref 3.4–5)
ALP SERPL-CCNC: 48 U/L (ref 33–136)
ALT SERPL W P-5'-P-CCNC: 15 U/L (ref 7–45)
ANION GAP SERPL CALC-SCNC: 14 MMOL/L (ref 10–20)
AST SERPL W P-5'-P-CCNC: 17 U/L (ref 9–39)
BILIRUB SERPL-MCNC: 0.6 MG/DL (ref 0–1.2)
BUN SERPL-MCNC: 20 MG/DL (ref 6–23)
CALCIUM SERPL-MCNC: 10 MG/DL (ref 8.6–10.6)
CHLORIDE SERPL-SCNC: 104 MMOL/L (ref 98–107)
CO2 SERPL-SCNC: 26 MMOL/L (ref 21–32)
CREAT SERPL-MCNC: 0.9 MG/DL (ref 0.5–1.05)
EGFRCR SERPLBLD CKD-EPI 2021: 72 ML/MIN/1.73M*2
EST. AVERAGE GLUCOSE BLD GHB EST-MCNC: 126 MG/DL
GLUCOSE SERPL-MCNC: 96 MG/DL (ref 74–99)
HBA1C MFR BLD: 6 %
POTASSIUM SERPL-SCNC: 4.6 MMOL/L (ref 3.5–5.3)
PROT SERPL-MCNC: 6.8 G/DL (ref 6.4–8.2)
SODIUM SERPL-SCNC: 139 MMOL/L (ref 136–145)

## 2024-07-19 PROCEDURE — 80053 COMPREHEN METABOLIC PANEL: CPT

## 2024-07-19 PROCEDURE — 83036 HEMOGLOBIN GLYCOSYLATED A1C: CPT

## 2024-07-19 PROCEDURE — 36415 COLL VENOUS BLD VENIPUNCTURE: CPT

## 2024-07-24 ENCOUNTER — APPOINTMENT (OUTPATIENT)
Dept: ENDOCRINOLOGY | Facility: CLINIC | Age: 64
End: 2024-07-24
Payer: COMMERCIAL

## 2024-07-24 VITALS
HEART RATE: 80 BPM | SYSTOLIC BLOOD PRESSURE: 120 MMHG | WEIGHT: 186.8 LBS | RESPIRATION RATE: 16 BRPM | DIASTOLIC BLOOD PRESSURE: 70 MMHG | HEIGHT: 66 IN | BODY MASS INDEX: 30.02 KG/M2

## 2024-07-24 DIAGNOSIS — E11.65 TYPE 2 DIABETES MELLITUS WITH HYPERGLYCEMIA, WITHOUT LONG-TERM CURRENT USE OF INSULIN (MULTI): Primary | ICD-10-CM

## 2024-07-24 DIAGNOSIS — I10 HYPERTENSION, UNSPECIFIED TYPE: ICD-10-CM

## 2024-07-24 DIAGNOSIS — E78.5 HYPERLIPIDEMIA, UNSPECIFIED HYPERLIPIDEMIA TYPE: ICD-10-CM

## 2024-07-24 PROCEDURE — 3048F LDL-C <100 MG/DL: CPT | Performed by: INTERNAL MEDICINE

## 2024-07-24 PROCEDURE — 3078F DIAST BP <80 MM HG: CPT | Performed by: INTERNAL MEDICINE

## 2024-07-24 PROCEDURE — 99214 OFFICE O/P EST MOD 30 MIN: CPT | Performed by: INTERNAL MEDICINE

## 2024-07-24 PROCEDURE — 3044F HG A1C LEVEL LT 7.0%: CPT | Performed by: INTERNAL MEDICINE

## 2024-07-24 PROCEDURE — 3074F SYST BP LT 130 MM HG: CPT | Performed by: INTERNAL MEDICINE

## 2024-07-24 PROCEDURE — 3008F BODY MASS INDEX DOCD: CPT | Performed by: INTERNAL MEDICINE

## 2024-07-24 PROCEDURE — 3062F POS MACROALBUMINURIA REV: CPT | Performed by: INTERNAL MEDICINE

## 2024-07-24 RX ORDER — SEMAGLUTIDE 1.34 MG/ML
1 INJECTION, SOLUTION SUBCUTANEOUS
Qty: 9 ML | Refills: 3 | Status: SHIPPED | OUTPATIENT
Start: 2024-07-24 | End: 2025-07-24

## 2024-07-24 RX ORDER — LANSOPRAZOLE 30 MG/1
30 CAPSULE, DELAYED RELEASE ORAL DAILY
COMMUNITY
Start: 2024-05-13

## 2024-07-24 ASSESSMENT — ENCOUNTER SYMPTOMS
SHORTNESS OF BREATH: 0
PALPITATIONS: 0
COUGH: 0
CHILLS: 0
VOMITING: 0
FATIGUE: 0
FEVER: 0
HEADACHES: 0
DIARRHEA: 0
NAUSEA: 0

## 2024-07-24 NOTE — PROGRESS NOTES
Endocrinology: Follow up visit  Subjective   Patient ID: Maria Elena De Los Santos is a 63 y.o. female who presents for Diabetes (Type 2 ), Hyperlipidemia, and Hypertension.    PCP: Santiago Salinas MD    HPI  Since last visit moved to ozempic due to continued stocking issues with trulicity and mounjaro.   Sugars are excellent but feels they were better on higher dose of trulicity.  Some nausea with changes in dose but now doing well on 0.5 of ozempic.  Still very active and watching eating    Review of Systems   Constitutional:  Negative for chills, fatigue and fever.   Respiratory:  Negative for cough and shortness of breath.    Cardiovascular:  Negative for chest pain and palpitations.   Gastrointestinal:  Negative for diarrhea, nausea and vomiting.   Neurological:  Negative for headaches.       Patient Active Problem List   Diagnosis    Atrophy of vagina    B12 deficiency    Elevated cholesterol    Essential (primary) hypertension    History of right knee joint replacement    Hypercalcemia    Hyperglycemia due to type 2 diabetes mellitus (Multi)    Primary osteoarthritis of left knee    Other obesity    Type 2 diabetes mellitus with diabetic polyneuropathy, without long-term current use of insulin (Multi)    Vitamin D deficiency        Home Meds:  Current Outpatient Medications   Medication Instructions    ALPRAZolam (XANAX) 0.5 mg, oral, As needed    aspirin (YESSI LOW DOSE ASPIRIN) 81 mg, oral, Daily    blood sugar diagnostic (Accu-Chek Amelia Plus test strp) strip 1 each, in vitro, Daily    cetirizine-pseudoephedrine (ZyrTEC-D) 5-120 mg 12 hr tablet oral    cholecalciferol (Vitamin D-3) 50 MCG (2000 UT) tablet oral    cyanocobalamin (VITAMIN B-12) 1,000 mcg, oral, Daily    cycloSPORINE (Restasis) 0.05 % ophthalmic emulsion 1 drop, Both Eyes, 2 times daily    estradiol (Estrace) 0.01 % (0.1 mg/gram) vaginal cream vaginal    fluticasone (Flonase) 50 mcg/actuation nasal spray 1 spray, Each Nostril, Daily, Shake  gently. Before first use, prime pump. After use, clean tip and replace cap.    lansoprazole (PREVACID) 30 mg, oral, Daily, BEFORE A MEAL    lisinopriL-hydrochlorothiazide 10-12.5 mg tablet 1 tablet, oral, Daily    meclizine (ANTIVERT) 12.5 mg, oral, As needed    medical cannabis 1 each, oral    metFORMIN XR (GLUCOPHAGE-XR) 1,000 mg, oral, Daily with evening meal, Do not crush, chew, or split.    Mounjaro 2.5 mg, subcutaneous, Once Weekly    Mounjaro 5 mg, subcutaneous, Once Weekly    multivitamin tablet 1 tablet, oral, Daily    omega-3/dha/epa/fish oil (OMEGA-3 ORAL) 1 capsule, oral, Daily, Omega 3 2000    Ozempic 0.5 mg, subcutaneous, Every 7 days    pantoprazole (ProtoNix) 40 mg EC tablet 1 tablet, oral, Daily    rosuvastatin (CRESTOR) 10 mg, oral, Daily    vitamins A,C,E-zinc-copper (PreserVision AREDS) 4,296 mcg-226 mg-90 mg capsule  as directed Orally        Allergies   Allergen Reactions    Hydromorphone Other     Blood pressure dropped    Codeine Other     stomach upset        Objective   Vitals:    07/24/24 1124   BP: 120/70   Pulse: 80   Resp: 16      Vitals:    07/24/24 1124   Weight: 84.7 kg (186 lb 12.8 oz)      Body mass index is 30.17 kg/m².   Physical Exam  Constitutional:       Appearance: Normal appearance. She is overweight.   HENT:      Head: Normocephalic and atraumatic.   Neck:      Thyroid: No thyroid mass, thyromegaly or thyroid tenderness.   Cardiovascular:      Rate and Rhythm: Normal rate and regular rhythm.      Heart sounds: No murmur heard.     No gallop.   Pulmonary:      Effort: Pulmonary effort is normal.      Breath sounds: Normal breath sounds.   Abdominal:      Palpations: Abdomen is soft.      Comments: benign   Neurological:      General: No focal deficit present.      Mental Status: She is alert and oriented to person, place, and time.      Deep Tendon Reflexes: Reflexes are normal and symmetric.   Psychiatric:         Behavior: Behavior is cooperative.         Labs:  Lab  "Results   Component Value Date    HGBA1C 6.0 (H) 07/19/2024    TSH 1.40 04/11/2024      No results found for: \"PR1\", \"THYROIDPAB\", \"TSI\"     Assessment/Plan   Assessment & Plan  Type 2 diabetes mellitus with hyperglycemia, without long-term current use of insulin (Multi)  Reassured A1c is excellent at 6.0  She would like to increase ozempic to 1 mg which we will do: watch for gi issues  Hyperlipidemia, unspecified hyperlipidemia type  Continue statin, recheck next time  Hypertension, unspecified type  Bp excellent  Follow up in 4 months      Electronically signed by:  Uyen Trujillo MD 07/24/24 11:25 AM              "

## 2024-08-16 ENCOUNTER — APPOINTMENT (OUTPATIENT)
Dept: ENDOCRINOLOGY | Facility: CLINIC | Age: 64
End: 2024-08-16
Payer: COMMERCIAL

## 2024-11-05 ENCOUNTER — LAB (OUTPATIENT)
Dept: LAB | Facility: LAB | Age: 64
End: 2024-11-05
Payer: COMMERCIAL

## 2024-11-05 DIAGNOSIS — E11.65 TYPE 2 DIABETES MELLITUS WITH HYPERGLYCEMIA, WITHOUT LONG-TERM CURRENT USE OF INSULIN: ICD-10-CM

## 2024-11-05 DIAGNOSIS — E78.5 HYPERLIPIDEMIA, UNSPECIFIED HYPERLIPIDEMIA TYPE: ICD-10-CM

## 2024-11-05 LAB
ALBUMIN SERPL BCP-MCNC: 4.3 G/DL (ref 3.4–5)
ALP SERPL-CCNC: 45 U/L (ref 33–136)
ALT SERPL W P-5'-P-CCNC: 15 U/L (ref 7–45)
ANION GAP SERPL CALC-SCNC: 14 MMOL/L (ref 10–20)
AST SERPL W P-5'-P-CCNC: 13 U/L (ref 9–39)
BILIRUB SERPL-MCNC: 0.4 MG/DL (ref 0–1.2)
BUN SERPL-MCNC: 23 MG/DL (ref 6–23)
CALCIUM SERPL-MCNC: 9.9 MG/DL (ref 8.6–10.6)
CHLORIDE SERPL-SCNC: 103 MMOL/L (ref 98–107)
CHOLEST SERPL-MCNC: 135 MG/DL (ref 0–199)
CHOLESTEROL/HDL RATIO: 2.4
CO2 SERPL-SCNC: 26 MMOL/L (ref 21–32)
CREAT SERPL-MCNC: 0.87 MG/DL (ref 0.5–1.05)
EGFRCR SERPLBLD CKD-EPI 2021: 75 ML/MIN/1.73M*2
EST. AVERAGE GLUCOSE BLD GHB EST-MCNC: 120 MG/DL
GLUCOSE SERPL-MCNC: 89 MG/DL (ref 74–99)
HBA1C MFR BLD: 5.8 %
HDLC SERPL-MCNC: 56.6 MG/DL
LDLC SERPL CALC-MCNC: 49 MG/DL
NON HDL CHOLESTEROL: 78 MG/DL (ref 0–149)
POTASSIUM SERPL-SCNC: 4.6 MMOL/L (ref 3.5–5.3)
PROT SERPL-MCNC: 6.4 G/DL (ref 6.4–8.2)
SODIUM SERPL-SCNC: 138 MMOL/L (ref 136–145)
TRIGL SERPL-MCNC: 147 MG/DL (ref 0–149)
VLDL: 29 MG/DL (ref 0–40)

## 2024-11-05 PROCEDURE — 36415 COLL VENOUS BLD VENIPUNCTURE: CPT

## 2024-11-05 PROCEDURE — 80053 COMPREHEN METABOLIC PANEL: CPT

## 2024-11-05 PROCEDURE — 80061 LIPID PANEL: CPT

## 2024-11-05 PROCEDURE — 83036 HEMOGLOBIN GLYCOSYLATED A1C: CPT

## 2024-11-12 ENCOUNTER — APPOINTMENT (OUTPATIENT)
Dept: ENDOCRINOLOGY | Facility: CLINIC | Age: 64
End: 2024-11-12
Payer: COMMERCIAL

## 2024-11-12 VITALS
HEART RATE: 76 BPM | RESPIRATION RATE: 16 BRPM | SYSTOLIC BLOOD PRESSURE: 124 MMHG | BODY MASS INDEX: 29.54 KG/M2 | DIASTOLIC BLOOD PRESSURE: 76 MMHG | WEIGHT: 183.8 LBS | HEIGHT: 66 IN

## 2024-11-12 DIAGNOSIS — E78.5 HYPERLIPIDEMIA, UNSPECIFIED HYPERLIPIDEMIA TYPE: ICD-10-CM

## 2024-11-12 DIAGNOSIS — E11.65 TYPE 2 DIABETES MELLITUS WITH HYPERGLYCEMIA, WITHOUT LONG-TERM CURRENT USE OF INSULIN: Primary | ICD-10-CM

## 2024-11-12 DIAGNOSIS — I10 HYPERTENSION, UNSPECIFIED TYPE: ICD-10-CM

## 2024-11-12 PROCEDURE — 99214 OFFICE O/P EST MOD 30 MIN: CPT | Performed by: INTERNAL MEDICINE

## 2024-11-12 PROCEDURE — 3048F LDL-C <100 MG/DL: CPT | Performed by: INTERNAL MEDICINE

## 2024-11-12 PROCEDURE — 3044F HG A1C LEVEL LT 7.0%: CPT | Performed by: INTERNAL MEDICINE

## 2024-11-12 PROCEDURE — 3062F POS MACROALBUMINURIA REV: CPT | Performed by: INTERNAL MEDICINE

## 2024-11-12 PROCEDURE — 3078F DIAST BP <80 MM HG: CPT | Performed by: INTERNAL MEDICINE

## 2024-11-12 PROCEDURE — 3074F SYST BP LT 130 MM HG: CPT | Performed by: INTERNAL MEDICINE

## 2024-11-12 PROCEDURE — 3008F BODY MASS INDEX DOCD: CPT | Performed by: INTERNAL MEDICINE

## 2024-11-12 RX ORDER — SEMAGLUTIDE 1.34 MG/ML
1 INJECTION, SOLUTION SUBCUTANEOUS
Qty: 9 ML | Refills: 3 | Status: SHIPPED | OUTPATIENT
Start: 2024-11-12 | End: 2025-11-12

## 2024-11-12 ASSESSMENT — ENCOUNTER SYMPTOMS
SHORTNESS OF BREATH: 0
FEVER: 0
DIARRHEA: 0
NAUSEA: 0
COUGH: 0
CHILLS: 0
HEADACHES: 0
PALPITATIONS: 0
FATIGUE: 0
VOMITING: 0

## 2024-11-12 NOTE — PROGRESS NOTES
Endocrinology: Follow up visit  Subjective   Patient ID: Maria Elena De Los Santos is a 64 y.o. female who presents for Diabetes (Type 2 ), Hyperlipidemia, and Hypertension.    PCP: Santiago Salinas MD    HPI  Dm2  Metformin 1000 every day  Ozempic 1 mg    Last visit increased to ozempic to 1 mg  Some initial nausea that passed quickly  Feeling well  Weight down a bit  Current with eye exam    Review of Systems   Constitutional:  Negative for chills, fatigue and fever.   Respiratory:  Negative for cough and shortness of breath.    Cardiovascular:  Negative for chest pain and palpitations.   Gastrointestinal:  Negative for diarrhea, nausea and vomiting.   Neurological:  Negative for headaches.       Patient Active Problem List   Diagnosis    Atrophy of vagina    B12 deficiency    Elevated cholesterol    Essential (primary) hypertension    History of right knee joint replacement    Hypercalcemia    Hyperglycemia due to type 2 diabetes mellitus (Multi)    Primary osteoarthritis of left knee    Other obesity    Type 2 diabetes mellitus with diabetic polyneuropathy, without long-term current use of insulin    Vitamin D deficiency        Home Meds:  Current Outpatient Medications   Medication Instructions    ALPRAZolam (XANAX) 0.5 mg, As needed    aspirin (YESSI LOW DOSE ASPIRIN) 81 mg, Daily    blood sugar diagnostic (Accu-Chek Amelia Plus test strp) strip 1 each, in vitro, Daily    cetirizine-pseudoephedrine (ZyrTEC-D) 5-120 mg 12 hr tablet Take by mouth.    cholecalciferol (Vitamin D-3) 50 MCG (2000 UT) tablet Take by mouth.    cyanocobalamin (VITAMIN B-12) 1,000 mcg, Daily    cycloSPORINE (Restasis) 0.05 % ophthalmic emulsion 1 drop, 2 times daily    estradiol (Estrace) 0.01 % (0.1 mg/gram) vaginal cream Insert into the vagina.    fluticasone (Flonase) 50 mcg/actuation nasal spray 1 spray, Daily    lansoprazole (PREVACID) 30 mg, Daily    lisinopriL-hydrochlorothiazide 10-12.5 mg tablet 1 tablet, Daily    meclizine  "(ANTIVERT) 12.5 mg, As needed    medical cannabis 1 each    metFORMIN XR (GLUCOPHAGE-XR) 1,000 mg, oral, Daily with evening meal, Do not crush, chew, or split.    Mounjaro 5 mg, subcutaneous, Once Weekly    multivitamin tablet 1 tablet, Daily    omega-3/dha/epa/fish oil (OMEGA-3 ORAL) 1 capsule, Daily    Ozempic 0.5 mg, subcutaneous, Every 7 days    Ozempic 1 mg, subcutaneous, Every 7 days    rosuvastatin (CRESTOR) 10 mg, oral, Daily    vitamins A,C,E-zinc-copper (PreserVision AREDS) 4,296 mcg-226 mg-90 mg capsule as directed Orally        Allergies   Allergen Reactions    Hydromorphone Other     Blood pressure dropped    Codeine Other     stomach upset        Objective   Vitals:    11/12/24 1423   BP: 124/76   Pulse: 76   Resp: 16      Vitals:    11/12/24 1423   Weight: 83.4 kg (183 lb 12.8 oz)      Body mass index is 29.68 kg/m².   Physical Exam  Constitutional:       Appearance: Normal appearance. She is overweight.   HENT:      Head: Normocephalic and atraumatic.   Neck:      Thyroid: No thyroid mass, thyromegaly or thyroid tenderness.   Cardiovascular:      Rate and Rhythm: Normal rate and regular rhythm.      Heart sounds: No murmur heard.     No gallop.   Pulmonary:      Effort: Pulmonary effort is normal.      Breath sounds: Normal breath sounds.   Abdominal:      Palpations: Abdomen is soft.      Comments: benign   Neurological:      General: No focal deficit present.      Mental Status: She is alert and oriented to person, place, and time.      Deep Tendon Reflexes: Reflexes are normal and symmetric.   Psychiatric:         Behavior: Behavior is cooperative.         Labs:  Lab Results   Component Value Date    HGBA1C 5.8 (H) 11/05/2024    TSH 1.40 04/11/2024      No results found for: \"PR1\", \"THYROIDPAB\", \"TSI\"     Assessment/Plan   Assessment & Plan  Type 2 diabetes mellitus with hyperglycemia, without long-term current use of insulin    Sugars are great  No change to meds needed  Hyperlipidemia, " unspecified hyperlipidemia type    Stable on statin  Hypertension, unspecified type    Bp excellent        Electronically signed by:  Uyen Trujillo MD 11/12/24 2:33 PM

## 2024-12-22 DIAGNOSIS — E11.65 TYPE 2 DIABETES MELLITUS WITH HYPERGLYCEMIA, WITHOUT LONG-TERM CURRENT USE OF INSULIN: ICD-10-CM

## 2024-12-22 RX ORDER — METFORMIN HYDROCHLORIDE 500 MG/1
TABLET, EXTENDED RELEASE ORAL
Qty: 180 TABLET | Refills: 1 | Status: SHIPPED | OUTPATIENT
Start: 2024-12-22

## 2025-01-15 DIAGNOSIS — E78.00 ELEVATED CHOLESTEROL: ICD-10-CM

## 2025-01-15 RX ORDER — ROSUVASTATIN CALCIUM 10 MG/1
10 TABLET, COATED ORAL DAILY
Qty: 90 TABLET | Refills: 3 | Status: SHIPPED | OUTPATIENT
Start: 2025-01-15 | End: 2026-01-15

## 2025-01-21 ENCOUNTER — HOSPITAL ENCOUNTER (OUTPATIENT)
Dept: RADIOLOGY | Facility: CLINIC | Age: 65
Discharge: HOME | End: 2025-01-21
Payer: COMMERCIAL

## 2025-01-21 DIAGNOSIS — N20.0 KIDNEY STONES: ICD-10-CM

## 2025-01-21 DIAGNOSIS — E78.00 ELEVATED CHOLESTEROL: ICD-10-CM

## 2025-01-21 PROCEDURE — 74018 RADEX ABDOMEN 1 VIEW: CPT

## 2025-01-21 RX ORDER — ROSUVASTATIN CALCIUM 10 MG/1
10 TABLET, COATED ORAL DAILY
Qty: 90 TABLET | Refills: 3 | Status: SHIPPED | OUTPATIENT
Start: 2025-01-21 | End: 2026-01-21

## 2025-01-24 ENCOUNTER — APPOINTMENT (OUTPATIENT)
Dept: UROLOGY | Facility: CLINIC | Age: 65
End: 2025-01-24
Payer: COMMERCIAL

## 2025-01-24 VITALS — TEMPERATURE: 96.7 F

## 2025-01-24 DIAGNOSIS — N20.0 KIDNEY STONES: Primary | ICD-10-CM

## 2025-01-24 PROCEDURE — 99203 OFFICE O/P NEW LOW 30 MIN: CPT | Performed by: SURGERY

## 2025-01-24 ASSESSMENT — PAIN SCALES - GENERAL: PAINLEVEL_OUTOF10: 0-NO PAIN

## 2025-01-24 NOTE — PROGRESS NOTES
Subjective   Patient ID: Maria Elena De Los Santos is a 64 y.o. female who presents for Nephrolithiasis (Follow up).    HPI  She is well-known to me from my previous practice.  She has a history of kidney stones.  Her last episode she was able to pass her stone.  She has not had any interventions.  Today she is doing well.  She has no new complaints.  She does keep up with hydration.  She has no voiding issues.        Review of Systems  As per HPI, remaining 10 systems negative          Objective   Physical Exam  Well nourished  Breathing comfortably  Abdomen soft, ND, NT              Assessment/Plan   Problem List Items Addressed This Visit    None  Visit Diagnoses         Codes    Kidney stones    -  Primary N20.0    Relevant Orders    XR abdomen 1 view (Completed)    XR abdomen 1 view    Follow Up In Urology             I reviewed her x-ray today.  I do not see any new urinary tract stones.  We discussed prevention measures including diet and hydration.  Return in 1 year with a KUB.             Christa Darling MD 01/24/25 10:10 AM

## 2025-04-04 DIAGNOSIS — E11.65 TYPE 2 DIABETES MELLITUS WITH HYPERGLYCEMIA, WITHOUT LONG-TERM CURRENT USE OF INSULIN: ICD-10-CM

## 2025-04-04 RX ORDER — BLOOD SUGAR DIAGNOSTIC
1 STRIP MISCELLANEOUS DAILY
Qty: 100 EACH | Refills: 3 | Status: SHIPPED | OUTPATIENT
Start: 2025-04-04

## 2025-04-14 ENCOUNTER — APPOINTMENT (OUTPATIENT)
Dept: OBSTETRICS AND GYNECOLOGY | Facility: CLINIC | Age: 65
End: 2025-04-14
Payer: COMMERCIAL

## 2025-04-14 VITALS
SYSTOLIC BLOOD PRESSURE: 126 MMHG | HEIGHT: 66 IN | DIASTOLIC BLOOD PRESSURE: 75 MMHG | HEART RATE: 84 BPM | WEIGHT: 183 LBS | BODY MASS INDEX: 29.41 KG/M2

## 2025-04-14 DIAGNOSIS — N95.2 ATROPHY OF VAGINA: ICD-10-CM

## 2025-04-14 DIAGNOSIS — Z01.419 ENCOUNTER FOR GYNECOLOGICAL EXAMINATION WITHOUT ABNORMAL FINDING: Primary | ICD-10-CM

## 2025-04-14 PROCEDURE — 3074F SYST BP LT 130 MM HG: CPT | Performed by: OBSTETRICS & GYNECOLOGY

## 2025-04-14 PROCEDURE — 1036F TOBACCO NON-USER: CPT | Performed by: OBSTETRICS & GYNECOLOGY

## 2025-04-14 PROCEDURE — 3008F BODY MASS INDEX DOCD: CPT | Performed by: OBSTETRICS & GYNECOLOGY

## 2025-04-14 PROCEDURE — 3078F DIAST BP <80 MM HG: CPT | Performed by: OBSTETRICS & GYNECOLOGY

## 2025-04-14 PROCEDURE — 87624 HPV HI-RISK TYP POOLED RSLT: CPT

## 2025-04-14 PROCEDURE — 99396 PREV VISIT EST AGE 40-64: CPT | Performed by: OBSTETRICS & GYNECOLOGY

## 2025-04-14 PROCEDURE — 88175 CYTOPATH C/V AUTO FLUID REDO: CPT

## 2025-04-14 RX ORDER — ESTRADIOL 0.1 MG/G
0.5 CREAM VAGINAL 3 TIMES WEEKLY
Qty: 42.5 G | Refills: 3 | Status: SHIPPED | OUTPATIENT
Start: 2025-04-14

## 2025-04-14 NOTE — PROGRESS NOTES
Subjective   Maria Elena De Los Santos is a 64 y.o. female here for a routine exam.  There is no postmenopausal bleeding or discharge.  No dysuria or change in bowel habits.  We discussed some constipation that she relates to Ozempic.  She has had 13 pound weight loss since last visit.   She is current on her colonoscopy, last on 2025.  She has it every 3 years.    She does uses estradiol cream intermittently for vaginal dryness.    Personal health questionnaire reviewed: yes.     Gynecologic History  No LMP recorded (lmp unknown). Patient is postmenopausal.  Contraception: post menopausal status  Last Pap: 3/19/21. Results were: normal  Last mammogram: 24. Results were: normal    Obstetric History  OB History    Para Term  AB Living   0 0 0         SAB IAB Ectopic Multiple Live Births                   Objective   Constitutional: Alert and in no acute distress. Well developed, well nourished.   Head and Face: Head and face: Normal.    Eyes: Normal external exam - nonicteric sclera, extraocular movements intact (EOMI) and no ptosis.   Neck: No neck asymmetry. Supple. Thyroid not enlarged and there were no palpable thyroid nodules.    Pulmonary: No respiratory distress.   Chest: Breasts: Normal appearance, no nipple discharge and no skin changes. Palpation of breasts and axillae: No palpable mass and no axillary lymphadenopathy.   Abdomen: Soft nontender; no abdominal mass palpated. No organomegaly. No hernias.   Genitourinary: External genitalia: Normal. No inguinal lymphadenopathy. Bartholin's Urethral and Skenes Glands: Normal. Urethra: Normal.  Bladder: Normal on palpation. Vagina: Normal. Cervix: Normal.  Uterus: Normal.  Right Adnexa/parametria: Normal.  Left Adnexa/parametria: Normal.  Inspection of Perianal Area: Normal.   Musculoskeletal: No joint swelling seen, normal movements of all extremities.   Skin: Normal skin color and pigmentation, normal skin turgor, and no rash.   Neurologic:  Non-focal. Grossly intact.   Psychiatric: Alert and oriented x 3. Affect normal to patient baseline. Mood: Appropriate.  Physical Exam     Assessment/Plan   Healthy female exam.  This is a 64-year-old female with a normal exam.  A Pap smear was sent.    Her routine mammogram was ordered with tomosynthesis.    A refill for estradiol cream was ordered to the pharmacy.  I recommend a pea-sized amount in the vagina 3 times weekly for the genitourinary syndrome of menopause.  I recommend follow-up in 1 year.  Education reviewed: self breast exams.  Mammogram ordered.

## 2025-05-06 LAB
ALBUMIN SERPL-MCNC: 4.5 G/DL (ref 3.6–5.1)
ALP SERPL-CCNC: 42 U/L (ref 37–153)
ALT SERPL-CCNC: 17 U/L (ref 6–29)
ANION GAP SERPL CALCULATED.4IONS-SCNC: 12 MMOL/L (CALC) (ref 7–17)
AST SERPL-CCNC: 19 U/L (ref 10–35)
BILIRUB SERPL-MCNC: 0.4 MG/DL (ref 0.2–1.2)
BUN SERPL-MCNC: 20 MG/DL (ref 7–25)
CALCIUM SERPL-MCNC: 10 MG/DL (ref 8.6–10.4)
CHLORIDE SERPL-SCNC: 104 MMOL/L (ref 98–110)
CO2 SERPL-SCNC: 25 MMOL/L (ref 20–32)
CREAT SERPL-MCNC: 1 MG/DL (ref 0.5–1.05)
EGFRCR SERPLBLD CKD-EPI 2021: 63 ML/MIN/1.73M2
EST. AVERAGE GLUCOSE BLD GHB EST-MCNC: 120 MG/DL
EST. AVERAGE GLUCOSE BLD GHB EST-SCNC: 6.6 MMOL/L
GLUCOSE SERPL-MCNC: 102 MG/DL (ref 65–99)
HBA1C MFR BLD: 5.8 %
POTASSIUM SERPL-SCNC: 4.5 MMOL/L (ref 3.5–5.3)
PROT SERPL-MCNC: 6.5 G/DL (ref 6.1–8.1)
SODIUM SERPL-SCNC: 141 MMOL/L (ref 135–146)

## 2025-05-07 DIAGNOSIS — N95.2 ATROPHY OF VAGINA: ICD-10-CM

## 2025-05-07 RX ORDER — ESTRADIOL 0.1 MG/G
0.5 CREAM VAGINAL 3 TIMES WEEKLY
Qty: 126 G | Refills: 2 | OUTPATIENT
Start: 2025-05-07

## 2025-05-07 RX ORDER — ESTRADIOL 0.1 MG/G
0.5 CREAM VAGINAL 3 TIMES WEEKLY
Qty: 42.5 G | Refills: 3 | Status: SHIPPED | OUTPATIENT
Start: 2025-05-07

## 2025-05-12 ENCOUNTER — APPOINTMENT (OUTPATIENT)
Dept: ENDOCRINOLOGY | Facility: CLINIC | Age: 65
End: 2025-05-12
Payer: COMMERCIAL

## 2025-05-12 VITALS
SYSTOLIC BLOOD PRESSURE: 120 MMHG | WEIGHT: 179.6 LBS | HEART RATE: 70 BPM | HEIGHT: 66 IN | DIASTOLIC BLOOD PRESSURE: 70 MMHG | RESPIRATION RATE: 16 BRPM | BODY MASS INDEX: 28.87 KG/M2

## 2025-05-12 DIAGNOSIS — E78.5 HYPERLIPIDEMIA, UNSPECIFIED HYPERLIPIDEMIA TYPE: ICD-10-CM

## 2025-05-12 DIAGNOSIS — I10 HYPERTENSION, UNSPECIFIED TYPE: ICD-10-CM

## 2025-05-12 DIAGNOSIS — E11.65 TYPE 2 DIABETES MELLITUS WITH HYPERGLYCEMIA, WITHOUT LONG-TERM CURRENT USE OF INSULIN: Primary | ICD-10-CM

## 2025-05-12 PROBLEM — E11.42 TYPE 2 DIABETES MELLITUS WITH DIABETIC POLYNEUROPATHY, WITHOUT LONG-TERM CURRENT USE OF INSULIN: Status: RESOLVED | Noted: 2023-08-23 | Resolved: 2025-05-12

## 2025-05-12 PROCEDURE — 99214 OFFICE O/P EST MOD 30 MIN: CPT | Performed by: INTERNAL MEDICINE

## 2025-05-12 PROCEDURE — 1036F TOBACCO NON-USER: CPT | Performed by: INTERNAL MEDICINE

## 2025-05-12 PROCEDURE — 3078F DIAST BP <80 MM HG: CPT | Performed by: INTERNAL MEDICINE

## 2025-05-12 PROCEDURE — 3008F BODY MASS INDEX DOCD: CPT | Performed by: INTERNAL MEDICINE

## 2025-05-12 PROCEDURE — 3074F SYST BP LT 130 MM HG: CPT | Performed by: INTERNAL MEDICINE

## 2025-05-12 ASSESSMENT — ENCOUNTER SYMPTOMS
PALPITATIONS: 0
COUGH: 0
FATIGUE: 0
DIARRHEA: 0
CHILLS: 0
NAUSEA: 0
VOMITING: 0
SHORTNESS OF BREATH: 0
FEVER: 0
HEADACHES: 0

## 2025-05-12 NOTE — PROGRESS NOTES
Endocrinology: Follow up visit  Subjective   Patient ID: Maria Elena De Los Santos is a 64 y.o. female who presents for Diabetes (Type 2 ), Hyperlipidemia, and Hypertension.    PCP: Santiago Salinas MD    HPI  Dm2  Metformin 1000 every day  Ozempic 1 mg    Since last visit doing great with sugars  No issues with meds   Utd with eye exam  Feels well    Review of Systems   Constitutional:  Negative for chills, fatigue and fever.   Respiratory:  Negative for cough and shortness of breath.    Cardiovascular:  Negative for chest pain and palpitations.   Gastrointestinal:  Negative for diarrhea, nausea and vomiting.   Neurological:  Negative for headaches.       Problem List[1]     Home Meds:  Current Outpatient Medications   Medication Instructions    ALPRAZolam (XANAX) 0.5 mg, As needed    aspirin (YESSI LOW DOSE ASPIRIN) 81 mg, Daily    blood sugar diagnostic (Accu-Chek Amelia Plus test strp) 1 each, in vitro, Daily    cetirizine-pseudoephedrine (ZyrTEC-D) 5-120 mg 12 hr tablet Take by mouth.    cholecalciferol (Vitamin D-3) 50 MCG (2000 UT) tablet Take by mouth.    cyanocobalamin (VITAMIN B-12) 1,000 mcg, Daily    cycloSPORINE (Restasis) 0.05 % ophthalmic emulsion 1 drop, 2 times daily    estradiol (ESTRACE) 0.5 g, vaginal, 3 times weekly, Use a pea-sized amount (0.5 g) in the vagina 3 times a week.    fluticasone (Flonase) 50 mcg/actuation nasal spray 1 spray, Daily    lansoprazole (PREVACID) 30 mg, Daily    lisinopriL-hydrochlorothiazide 10-12.5 mg tablet 1 tablet, Daily    meclizine (ANTIVERT) 12.5 mg, As needed    medical cannabis 1 each    metFORMIN  mg 24 hr tablet PLEASE SEE ATTACHED FOR DETAILED DIRECTIONS    multivitamin tablet 1 tablet, Daily    omega-3/dha/epa/fish oil (OMEGA-3 ORAL) 1 capsule, Daily    Ozempic 1 mg, subcutaneous, Every 7 days    rosuvastatin (CRESTOR) 10 mg, oral, Daily    vitamins A,C,E-zinc-copper (PreserVision AREDS) 4,296 mcg-226 mg-90 mg capsule as directed Orally        RX  "Allergies[2]     Objective   Vitals:    05/12/25 1036   BP: 120/70   Pulse: 70   Resp: 16      Vitals:    05/12/25 1036   Weight: 81.5 kg (179 lb 9.6 oz)      Body mass index is 28.99 kg/m².   Physical Exam  Constitutional:       Appearance: Normal appearance. She is overweight.   HENT:      Head: Normocephalic and atraumatic.   Neck:      Thyroid: No thyroid mass, thyromegaly or thyroid tenderness.   Cardiovascular:      Rate and Rhythm: Normal rate and regular rhythm.      Heart sounds: No murmur heard.     No gallop.   Pulmonary:      Effort: Pulmonary effort is normal.      Breath sounds: Normal breath sounds.   Abdominal:      Palpations: Abdomen is soft.      Comments: benign   Neurological:      General: No focal deficit present.      Mental Status: She is alert and oriented to person, place, and time.      Deep Tendon Reflexes: Reflexes are normal and symmetric.   Psychiatric:         Behavior: Behavior is cooperative.         Labs:  Lab Results   Component Value Date    HGBA1C 5.8 (H) 05/05/2025    TSH 1.40 04/11/2024      No results found for: \"PR1\", \"THYROIDPAB\", \"TSI\"     Assessment/Plan   Assessment & Plan  Type 2 diabetes mellitus with hyperglycemia, without long-term current use of insulin  A1c is excellent  No change to meds  Orders:    CBC; Future    Comprehensive Metabolic Panel; Future    Lipid Panel; Future    Hemoglobin A1C; Future    Albumin-Creatinine Ratio, Urine Random; Future    Hyperlipidemia, unspecified hyperlipidemia type    Lipids due next time, stable on statin  Hypertension, unspecified type    Bp excellentkeep up      Electronically signed by:  Uyen Trujillo MD 05/12/25 10:57 AM                   [1]   Patient Active Problem List  Diagnosis    Atrophy of vagina    B12 deficiency    Elevated cholesterol    Essential (primary) hypertension    History of right knee joint replacement    Hypercalcemia    Hyperglycemia due to type 2 diabetes mellitus (Multi)    Primary osteoarthritis of " left knee    Other obesity    Vitamin D deficiency    Diabetes mellitus (Multi)   [2]   Allergies  Allergen Reactions    Hydromorphone Other     Blood pressure dropped    Codeine Other     stomach upset

## 2025-05-12 NOTE — ASSESSMENT & PLAN NOTE
A1c is excellent  No change to meds  Orders:    CBC; Future    Comprehensive Metabolic Panel; Future    Lipid Panel; Future    Hemoglobin A1C; Future    Albumin-Creatinine Ratio, Urine Random; Future

## 2025-06-06 DIAGNOSIS — E11.65 TYPE 2 DIABETES MELLITUS WITH HYPERGLYCEMIA, WITHOUT LONG-TERM CURRENT USE OF INSULIN: ICD-10-CM

## 2025-06-06 RX ORDER — METFORMIN HYDROCHLORIDE 500 MG/1
TABLET, EXTENDED RELEASE ORAL
Qty: 180 TABLET | Refills: 1 | Status: SHIPPED | OUTPATIENT
Start: 2025-06-06

## 2025-06-30 ENCOUNTER — HOSPITAL ENCOUNTER (OUTPATIENT)
Dept: RADIOLOGY | Facility: CLINIC | Age: 65
Discharge: HOME | End: 2025-06-30
Payer: COMMERCIAL

## 2025-06-30 VITALS — BODY MASS INDEX: 29.05 KG/M2 | WEIGHT: 180 LBS

## 2025-06-30 DIAGNOSIS — Z01.419 ENCOUNTER FOR GYNECOLOGICAL EXAMINATION WITHOUT ABNORMAL FINDING: ICD-10-CM

## 2025-06-30 PROCEDURE — 77063 BREAST TOMOSYNTHESIS BI: CPT

## 2025-06-30 PROCEDURE — 77063 BREAST TOMOSYNTHESIS BI: CPT | Performed by: STUDENT IN AN ORGANIZED HEALTH CARE EDUCATION/TRAINING PROGRAM

## 2025-06-30 PROCEDURE — 77067 SCR MAMMO BI INCL CAD: CPT | Performed by: STUDENT IN AN ORGANIZED HEALTH CARE EDUCATION/TRAINING PROGRAM

## 2025-07-21 ENCOUNTER — TELEPHONE (OUTPATIENT)
Dept: OBSTETRICS AND GYNECOLOGY | Facility: CLINIC | Age: 65
End: 2025-07-21
Payer: COMMERCIAL

## 2025-07-21 DIAGNOSIS — Z12.31 ENCOUNTER FOR SCREENING MAMMOGRAM FOR MALIGNANT NEOPLASM OF BREAST: Primary | ICD-10-CM

## 2025-07-21 NOTE — TELEPHONE ENCOUNTER
Spoke with patient and she wanted to know if Dr. Mars looks at the results. I explained to the patient that she reviews the results if they are abnormal and then will reach out to the patient with further recommendations. Patient verbalized understanding    English Mikayla

## 2025-07-21 NOTE — TELEPHONE ENCOUNTER
Left a voicemail, her mammogram from June 30, 2025 was normal.  I did place a mammogram for June 2026.

## 2026-01-09 ENCOUNTER — APPOINTMENT (OUTPATIENT)
Dept: UROLOGY | Facility: CLINIC | Age: 66
End: 2026-01-09
Payer: COMMERCIAL

## 2026-04-17 ENCOUNTER — APPOINTMENT (OUTPATIENT)
Facility: CLINIC | Age: 66
End: 2026-04-17
Payer: COMMERCIAL